# Patient Record
Sex: FEMALE | Race: OTHER | HISPANIC OR LATINO | URBAN - METROPOLITAN AREA
[De-identification: names, ages, dates, MRNs, and addresses within clinical notes are randomized per-mention and may not be internally consistent; named-entity substitution may affect disease eponyms.]

---

## 2019-04-23 ENCOUNTER — EMERGENCY (EMERGENCY)
Facility: HOSPITAL | Age: 59
LOS: 1 days | Discharge: HOME | End: 2019-04-23
Attending: EMERGENCY MEDICINE
Payer: MEDICAID

## 2019-04-23 VITALS
RESPIRATION RATE: 16 BRPM | HEART RATE: 75 BPM | SYSTOLIC BLOOD PRESSURE: 145 MMHG | TEMPERATURE: 98 F | DIASTOLIC BLOOD PRESSURE: 81 MMHG | OXYGEN SATURATION: 99 %

## 2019-04-23 VITALS
HEART RATE: 64 BPM | SYSTOLIC BLOOD PRESSURE: 152 MMHG | WEIGHT: 164.02 LBS | RESPIRATION RATE: 16 BRPM | HEIGHT: 60 IN | TEMPERATURE: 98 F | OXYGEN SATURATION: 98 % | DIASTOLIC BLOOD PRESSURE: 84 MMHG

## 2019-04-23 LAB
ALBUMIN SERPL ELPH-MCNC: 3.9 G/DL — SIGNIFICANT CHANGE UP (ref 3.5–5)
ALP SERPL-CCNC: 80 U/L — SIGNIFICANT CHANGE UP (ref 40–120)
ALT FLD-CCNC: 43 U/L DA — SIGNIFICANT CHANGE UP (ref 10–60)
ANION GAP SERPL CALC-SCNC: 5 MMOL/L — SIGNIFICANT CHANGE UP (ref 5–17)
APTT BLD: 32.2 SEC — SIGNIFICANT CHANGE UP (ref 27.5–36.3)
AST SERPL-CCNC: 27 U/L — SIGNIFICANT CHANGE UP (ref 10–40)
BILIRUB SERPL-MCNC: 0.3 MG/DL — SIGNIFICANT CHANGE UP (ref 0.2–1.2)
BUN SERPL-MCNC: 17 MG/DL — SIGNIFICANT CHANGE UP (ref 7–18)
CALCIUM SERPL-MCNC: 9.1 MG/DL — SIGNIFICANT CHANGE UP (ref 8.4–10.5)
CHLORIDE SERPL-SCNC: 107 MMOL/L — SIGNIFICANT CHANGE UP (ref 96–108)
CO2 SERPL-SCNC: 28 MMOL/L — SIGNIFICANT CHANGE UP (ref 22–31)
CREAT SERPL-MCNC: 0.62 MG/DL — SIGNIFICANT CHANGE UP (ref 0.5–1.3)
GLUCOSE SERPL-MCNC: 110 MG/DL — HIGH (ref 70–99)
HCT VFR BLD CALC: 41.9 % — SIGNIFICANT CHANGE UP (ref 34.5–45)
HGB BLD-MCNC: 13.5 G/DL — SIGNIFICANT CHANGE UP (ref 11.5–15.5)
INR BLD: 0.94 RATIO — SIGNIFICANT CHANGE UP (ref 0.88–1.16)
LIDOCAIN IGE QN: 131 U/L — SIGNIFICANT CHANGE UP (ref 73–393)
MAGNESIUM SERPL-MCNC: 2.3 MG/DL — SIGNIFICANT CHANGE UP (ref 1.6–2.6)
MCHC RBC-ENTMCNC: 31.5 PG — SIGNIFICANT CHANGE UP (ref 27–34)
MCHC RBC-ENTMCNC: 32.2 GM/DL — SIGNIFICANT CHANGE UP (ref 32–36)
MCV RBC AUTO: 97.7 FL — SIGNIFICANT CHANGE UP (ref 80–100)
NRBC # BLD: 0 /100 WBCS — SIGNIFICANT CHANGE UP (ref 0–0)
NT-PROBNP SERPL-SCNC: 57 PG/ML — SIGNIFICANT CHANGE UP (ref 0–125)
PLATELET # BLD AUTO: 270 K/UL — SIGNIFICANT CHANGE UP (ref 150–400)
POTASSIUM SERPL-MCNC: 4.6 MMOL/L — SIGNIFICANT CHANGE UP (ref 3.5–5.3)
POTASSIUM SERPL-SCNC: 4.6 MMOL/L — SIGNIFICANT CHANGE UP (ref 3.5–5.3)
PROT SERPL-MCNC: 7.6 G/DL — SIGNIFICANT CHANGE UP (ref 6–8.3)
PROTHROM AB SERPL-ACNC: 10.4 SEC — SIGNIFICANT CHANGE UP (ref 10–12.9)
RBC # BLD: 4.29 M/UL — SIGNIFICANT CHANGE UP (ref 3.8–5.2)
RBC # FLD: 12.8 % — SIGNIFICANT CHANGE UP (ref 10.3–14.5)
SODIUM SERPL-SCNC: 140 MMOL/L — SIGNIFICANT CHANGE UP (ref 135–145)
TROPONIN I SERPL-MCNC: <0.015 NG/ML — SIGNIFICANT CHANGE UP (ref 0–0.04)
TSH SERPL-MCNC: 1.49 UU/ML — SIGNIFICANT CHANGE UP (ref 0.34–4.82)
WBC # BLD: 6.44 K/UL — SIGNIFICANT CHANGE UP (ref 3.8–10.5)
WBC # FLD AUTO: 6.44 K/UL — SIGNIFICANT CHANGE UP (ref 3.8–10.5)

## 2019-04-23 PROCEDURE — 85027 COMPLETE CBC AUTOMATED: CPT

## 2019-04-23 PROCEDURE — 99284 EMERGENCY DEPT VISIT MOD MDM: CPT | Mod: 25

## 2019-04-23 PROCEDURE — 85610 PROTHROMBIN TIME: CPT

## 2019-04-23 PROCEDURE — 80053 COMPREHEN METABOLIC PANEL: CPT

## 2019-04-23 PROCEDURE — 36415 COLL VENOUS BLD VENIPUNCTURE: CPT

## 2019-04-23 PROCEDURE — 85730 THROMBOPLASTIN TIME PARTIAL: CPT

## 2019-04-23 PROCEDURE — 84484 ASSAY OF TROPONIN QUANT: CPT

## 2019-04-23 PROCEDURE — 83735 ASSAY OF MAGNESIUM: CPT

## 2019-04-23 PROCEDURE — 93005 ELECTROCARDIOGRAM TRACING: CPT

## 2019-04-23 PROCEDURE — 99285 EMERGENCY DEPT VISIT HI MDM: CPT

## 2019-04-23 PROCEDURE — 82962 GLUCOSE BLOOD TEST: CPT

## 2019-04-23 PROCEDURE — 83690 ASSAY OF LIPASE: CPT

## 2019-04-23 PROCEDURE — 83880 ASSAY OF NATRIURETIC PEPTIDE: CPT

## 2019-04-23 PROCEDURE — 84443 ASSAY THYROID STIM HORMONE: CPT

## 2019-04-23 NOTE — ED PROVIDER NOTE - PROGRESS NOTE DETAILS
Pt asymptomatic, recommended holter monitor, pt states she already had monitor for symptoms in the past, no arrythmia found. Educated on results, care and f/u.

## 2019-04-23 NOTE — ED ADULT NURSE NOTE - OBJECTIVE STATEMENT
alert and verbally responsive BIB EMS for sudden onset of palpitation this morning Pt denies C/O dizziness C/Pat at present

## 2019-04-23 NOTE — ED PROVIDER NOTE - OBJECTIVE STATEMENT
57 y/o F pt with a PMHx of migraine, presents to the ED with complaints of 2 episodes of palpitations accommodated with tingling in both hands and associated nausea and dizziness. Patient reports symptoms occurred around 9am this morning and this again 1 hour later. Patient notes she had similar symptoms 3 years ago and was seen in a hospital and discharged after testing. Patient had a recent cardiac workup and stress test done this year with normal results. Patient currently denies any other complaints. NKDA.

## 2019-04-23 NOTE — ED PROVIDER NOTE - CLINICAL SUMMARY MEDICAL DECISION MAKING FREE TEXT BOX
59 y/o F pt presents with episodes of palpitations with dizziness. Patient is asymptomatic at this time with no EKG changes. Will check labs, TSH and reassess. Anticipate discharge if results are normal.

## 2019-04-23 NOTE — ED ADULT NURSE NOTE - NSIMPLEMENTINTERV_GEN_ALL_ED
Implemented All Universal Safety Interventions:  [place] to call system. Call bell, personal items and telephone within reach. Instruct patient to call for assistance. Room bathroom lighting operational. Non-slip footwear when patient is off stretcher. Physically safe environment: no spills, clutter or unnecessary equipment. Stretcher in lowest position, wheels locked, appropriate side rails in place.

## 2019-06-02 ENCOUNTER — EMERGENCY (EMERGENCY)
Facility: HOSPITAL | Age: 59
LOS: 1 days | Discharge: HOME | End: 2019-06-02
Attending: EMERGENCY MEDICINE
Payer: MEDICAID

## 2019-06-02 VITALS
SYSTOLIC BLOOD PRESSURE: 138 MMHG | DIASTOLIC BLOOD PRESSURE: 83 MMHG | HEART RATE: 64 BPM | WEIGHT: 162.92 LBS | RESPIRATION RATE: 18 BRPM | OXYGEN SATURATION: 98 % | HEIGHT: 61 IN | TEMPERATURE: 98 F

## 2019-06-02 VITALS
HEART RATE: 65 BPM | RESPIRATION RATE: 18 BRPM | OXYGEN SATURATION: 98 % | SYSTOLIC BLOOD PRESSURE: 115 MMHG | DIASTOLIC BLOOD PRESSURE: 68 MMHG

## 2019-06-02 DIAGNOSIS — Z98.891 HISTORY OF UTERINE SCAR FROM PREVIOUS SURGERY: Chronic | ICD-10-CM

## 2019-06-02 LAB
ALBUMIN SERPL ELPH-MCNC: 3.5 G/DL — SIGNIFICANT CHANGE UP (ref 3.5–5)
ALP SERPL-CCNC: 76 U/L — SIGNIFICANT CHANGE UP (ref 40–120)
ALT FLD-CCNC: 22 U/L DA — SIGNIFICANT CHANGE UP (ref 10–60)
ANION GAP SERPL CALC-SCNC: 7 MMOL/L — SIGNIFICANT CHANGE UP (ref 5–17)
AST SERPL-CCNC: 15 U/L — SIGNIFICANT CHANGE UP (ref 10–40)
BASOPHILS # BLD AUTO: 0.02 K/UL — SIGNIFICANT CHANGE UP (ref 0–0.2)
BASOPHILS NFR BLD AUTO: 0.3 % — SIGNIFICANT CHANGE UP (ref 0–2)
BILIRUB SERPL-MCNC: 0.4 MG/DL — SIGNIFICANT CHANGE UP (ref 0.2–1.2)
BUN SERPL-MCNC: 16 MG/DL — SIGNIFICANT CHANGE UP (ref 7–18)
CALCIUM SERPL-MCNC: 9.2 MG/DL — SIGNIFICANT CHANGE UP (ref 8.4–10.5)
CHLORIDE SERPL-SCNC: 106 MMOL/L — SIGNIFICANT CHANGE UP (ref 96–108)
CK SERPL-CCNC: 33 U/L — SIGNIFICANT CHANGE UP (ref 21–215)
CO2 SERPL-SCNC: 29 MMOL/L — SIGNIFICANT CHANGE UP (ref 22–31)
CREAT SERPL-MCNC: 0.7 MG/DL — SIGNIFICANT CHANGE UP (ref 0.5–1.3)
D DIMER BLD IA.RAPID-MCNC: 998 NG/ML DDU — HIGH
EOSINOPHIL # BLD AUTO: 0.17 K/UL — SIGNIFICANT CHANGE UP (ref 0–0.5)
EOSINOPHIL NFR BLD AUTO: 2.7 % — SIGNIFICANT CHANGE UP (ref 0–6)
GLUCOSE SERPL-MCNC: 114 MG/DL — HIGH (ref 70–99)
HCT VFR BLD CALC: 40.5 % — SIGNIFICANT CHANGE UP (ref 34.5–45)
HGB BLD-MCNC: 12.9 G/DL — SIGNIFICANT CHANGE UP (ref 11.5–15.5)
IMM GRANULOCYTES NFR BLD AUTO: 0.3 % — SIGNIFICANT CHANGE UP (ref 0–1.5)
LYMPHOCYTES # BLD AUTO: 1.67 K/UL — SIGNIFICANT CHANGE UP (ref 1–3.3)
LYMPHOCYTES # BLD AUTO: 26.4 % — SIGNIFICANT CHANGE UP (ref 13–44)
MCHC RBC-ENTMCNC: 31.2 PG — SIGNIFICANT CHANGE UP (ref 27–34)
MCHC RBC-ENTMCNC: 31.9 GM/DL — LOW (ref 32–36)
MCV RBC AUTO: 98.1 FL — SIGNIFICANT CHANGE UP (ref 80–100)
MONOCYTES # BLD AUTO: 0.48 K/UL — SIGNIFICANT CHANGE UP (ref 0–0.9)
MONOCYTES NFR BLD AUTO: 7.6 % — SIGNIFICANT CHANGE UP (ref 2–14)
NEUTROPHILS # BLD AUTO: 3.97 K/UL — SIGNIFICANT CHANGE UP (ref 1.8–7.4)
NEUTROPHILS NFR BLD AUTO: 62.7 % — SIGNIFICANT CHANGE UP (ref 43–77)
NRBC # BLD: 0 /100 WBCS — SIGNIFICANT CHANGE UP (ref 0–0)
PLATELET # BLD AUTO: 270 K/UL — SIGNIFICANT CHANGE UP (ref 150–400)
POTASSIUM SERPL-MCNC: 4.4 MMOL/L — SIGNIFICANT CHANGE UP (ref 3.5–5.3)
POTASSIUM SERPL-SCNC: 4.4 MMOL/L — SIGNIFICANT CHANGE UP (ref 3.5–5.3)
PROT SERPL-MCNC: 7 G/DL — SIGNIFICANT CHANGE UP (ref 6–8.3)
RBC # BLD: 4.13 M/UL — SIGNIFICANT CHANGE UP (ref 3.8–5.2)
RBC # FLD: 13 % — SIGNIFICANT CHANGE UP (ref 10.3–14.5)
SODIUM SERPL-SCNC: 142 MMOL/L — SIGNIFICANT CHANGE UP (ref 135–145)
TROPONIN I SERPL-MCNC: <0.015 NG/ML — SIGNIFICANT CHANGE UP (ref 0–0.04)
WBC # BLD: 6.33 K/UL — SIGNIFICANT CHANGE UP (ref 3.8–10.5)
WBC # FLD AUTO: 6.33 K/UL — SIGNIFICANT CHANGE UP (ref 3.8–10.5)

## 2019-06-02 PROCEDURE — 99284 EMERGENCY DEPT VISIT MOD MDM: CPT | Mod: 25

## 2019-06-02 PROCEDURE — 71046 X-RAY EXAM CHEST 2 VIEWS: CPT | Mod: 26

## 2019-06-02 PROCEDURE — 96375 TX/PRO/DX INJ NEW DRUG ADDON: CPT | Mod: XU

## 2019-06-02 PROCEDURE — 71046 X-RAY EXAM CHEST 2 VIEWS: CPT

## 2019-06-02 PROCEDURE — 82550 ASSAY OF CK (CPK): CPT

## 2019-06-02 PROCEDURE — 93005 ELECTROCARDIOGRAM TRACING: CPT

## 2019-06-02 PROCEDURE — 71275 CT ANGIOGRAPHY CHEST: CPT

## 2019-06-02 PROCEDURE — 36415 COLL VENOUS BLD VENIPUNCTURE: CPT

## 2019-06-02 PROCEDURE — 96374 THER/PROPH/DIAG INJ IV PUSH: CPT | Mod: XU

## 2019-06-02 PROCEDURE — 85027 COMPLETE CBC AUTOMATED: CPT

## 2019-06-02 PROCEDURE — 80053 COMPREHEN METABOLIC PANEL: CPT

## 2019-06-02 PROCEDURE — 84484 ASSAY OF TROPONIN QUANT: CPT

## 2019-06-02 PROCEDURE — 71275 CT ANGIOGRAPHY CHEST: CPT | Mod: 26

## 2019-06-02 PROCEDURE — 99284 EMERGENCY DEPT VISIT MOD MDM: CPT

## 2019-06-02 PROCEDURE — 85379 FIBRIN DEGRADATION QUANT: CPT

## 2019-06-02 RX ORDER — ACETAMINOPHEN 500 MG
1000 TABLET ORAL ONCE
Refills: 0 | Status: COMPLETED | OUTPATIENT
Start: 2019-06-02 | End: 2019-06-02

## 2019-06-02 RX ORDER — KETOROLAC TROMETHAMINE 30 MG/ML
30 SYRINGE (ML) INJECTION ONCE
Refills: 0 | Status: DISCONTINUED | OUTPATIENT
Start: 2019-06-02 | End: 2019-06-02

## 2019-06-02 RX ADMIN — Medication 30 MILLIGRAM(S): at 09:38

## 2019-06-02 RX ADMIN — Medication 400 MILLIGRAM(S): at 13:19

## 2019-06-02 NOTE — ED PROVIDER NOTE - OBJECTIVE STATEMENT
58 y/o F pt with PMHx of HTN presents to ED c/o R breast pain x5 days. Pt describes pain as constant and worse with movement, sitting 60 y/o F pt with PMHx of HTN presents to ED c/o R breast pain x5 days. Pt describes pain as constant and worse with movement, sitting up, and deep inspiration. Pt also reports associated shortness of breath with pain only. 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 Pt took ibuprofen (last dose yesterday) with no symptomatic relief. Patient denies h/o similar pain, recent travel, fever, chills, cough, or any other complaints. Translation services offered but pt declined opting to have her friend at bedside translate.

## 2019-06-02 NOTE — ED ADULT TRIAGE NOTE - CHIEF COMPLAINT QUOTE
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

## 2019-06-02 NOTE — ED ADULT NURSE NOTE - OBJECTIVE STATEMENT
Pt AOx4, ambulatory, c/o right breast and right rib area pain x 5 days, s/p lifting a heavy patient, Pt works as a HHA. Pt endorses pain on with deep breathing. PT denies CP or palpitations, no fall/trauma.

## 2020-07-16 NOTE — ED PROVIDER NOTE - CHIEF COMPLAINT
See TE 3/12/2020 for surgery scheduling information. The patient is a 58y Female complaining of palpitations.

## 2020-12-14 ENCOUNTER — EMERGENCY (EMERGENCY)
Facility: HOSPITAL | Age: 60
LOS: 1 days | Discharge: HOME | End: 2020-12-14
Attending: EMERGENCY MEDICINE
Payer: MEDICAID

## 2020-12-14 VITALS
WEIGHT: 165.35 LBS | OXYGEN SATURATION: 98 % | HEIGHT: 61 IN | RESPIRATION RATE: 18 BRPM | SYSTOLIC BLOOD PRESSURE: 142 MMHG | HEART RATE: 67 BPM | TEMPERATURE: 98 F | DIASTOLIC BLOOD PRESSURE: 83 MMHG

## 2020-12-14 VITALS
HEART RATE: 68 BPM | OXYGEN SATURATION: 99 % | SYSTOLIC BLOOD PRESSURE: 115 MMHG | RESPIRATION RATE: 18 BRPM | DIASTOLIC BLOOD PRESSURE: 80 MMHG

## 2020-12-14 DIAGNOSIS — Z98.891 HISTORY OF UTERINE SCAR FROM PREVIOUS SURGERY: Chronic | ICD-10-CM

## 2020-12-14 PROBLEM — I10 ESSENTIAL (PRIMARY) HYPERTENSION: Chronic | Status: ACTIVE | Noted: 2019-06-02

## 2020-12-14 LAB
ALBUMIN SERPL ELPH-MCNC: 3.3 G/DL — LOW (ref 3.5–5)
ALP SERPL-CCNC: 79 U/L — SIGNIFICANT CHANGE UP (ref 40–120)
ALT FLD-CCNC: 28 U/L DA — SIGNIFICANT CHANGE UP (ref 10–60)
ANION GAP SERPL CALC-SCNC: 4 MMOL/L — LOW (ref 5–17)
APPEARANCE UR: CLEAR — SIGNIFICANT CHANGE UP
AST SERPL-CCNC: 21 U/L — SIGNIFICANT CHANGE UP (ref 10–40)
BASOPHILS # BLD AUTO: 0.02 K/UL — SIGNIFICANT CHANGE UP (ref 0–0.2)
BASOPHILS NFR BLD AUTO: 0.4 % — SIGNIFICANT CHANGE UP (ref 0–2)
BILIRUB SERPL-MCNC: 0.4 MG/DL — SIGNIFICANT CHANGE UP (ref 0.2–1.2)
BILIRUB UR-MCNC: NEGATIVE — SIGNIFICANT CHANGE UP
BUN SERPL-MCNC: 17 MG/DL — SIGNIFICANT CHANGE UP (ref 7–18)
CALCIUM SERPL-MCNC: 8.9 MG/DL — SIGNIFICANT CHANGE UP (ref 8.4–10.5)
CHLORIDE SERPL-SCNC: 109 MMOL/L — HIGH (ref 96–108)
CO2 SERPL-SCNC: 28 MMOL/L — SIGNIFICANT CHANGE UP (ref 22–31)
COLOR SPEC: YELLOW — SIGNIFICANT CHANGE UP
CREAT SERPL-MCNC: 0.73 MG/DL — SIGNIFICANT CHANGE UP (ref 0.5–1.3)
DIFF PNL FLD: ABNORMAL
EOSINOPHIL # BLD AUTO: 0.22 K/UL — SIGNIFICANT CHANGE UP (ref 0–0.5)
EOSINOPHIL NFR BLD AUTO: 4.7 % — SIGNIFICANT CHANGE UP (ref 0–6)
GLUCOSE SERPL-MCNC: 111 MG/DL — HIGH (ref 70–99)
GLUCOSE UR QL: NEGATIVE — SIGNIFICANT CHANGE UP
HCT VFR BLD CALC: 40.5 % — SIGNIFICANT CHANGE UP (ref 34.5–45)
HGB BLD-MCNC: 12.9 G/DL — SIGNIFICANT CHANGE UP (ref 11.5–15.5)
IMM GRANULOCYTES NFR BLD AUTO: 0.2 % — SIGNIFICANT CHANGE UP (ref 0–1.5)
KETONES UR-MCNC: NEGATIVE — SIGNIFICANT CHANGE UP
LEUKOCYTE ESTERASE UR-ACNC: ABNORMAL
LIDOCAIN IGE QN: 146 U/L — SIGNIFICANT CHANGE UP (ref 73–393)
LYMPHOCYTES # BLD AUTO: 1.39 K/UL — SIGNIFICANT CHANGE UP (ref 1–3.3)
LYMPHOCYTES # BLD AUTO: 30 % — SIGNIFICANT CHANGE UP (ref 13–44)
MCHC RBC-ENTMCNC: 31.4 PG — SIGNIFICANT CHANGE UP (ref 27–34)
MCHC RBC-ENTMCNC: 31.9 GM/DL — LOW (ref 32–36)
MCV RBC AUTO: 98.5 FL — SIGNIFICANT CHANGE UP (ref 80–100)
MONOCYTES # BLD AUTO: 0.44 K/UL — SIGNIFICANT CHANGE UP (ref 0–0.9)
MONOCYTES NFR BLD AUTO: 9.5 % — SIGNIFICANT CHANGE UP (ref 2–14)
NEUTROPHILS # BLD AUTO: 2.56 K/UL — SIGNIFICANT CHANGE UP (ref 1.8–7.4)
NEUTROPHILS NFR BLD AUTO: 55.2 % — SIGNIFICANT CHANGE UP (ref 43–77)
NITRITE UR-MCNC: NEGATIVE — SIGNIFICANT CHANGE UP
NRBC # BLD: 0 /100 WBCS — SIGNIFICANT CHANGE UP (ref 0–0)
PH UR: 6 — SIGNIFICANT CHANGE UP (ref 5–8)
PLATELET # BLD AUTO: 260 K/UL — SIGNIFICANT CHANGE UP (ref 150–400)
POTASSIUM SERPL-MCNC: 4.4 MMOL/L — SIGNIFICANT CHANGE UP (ref 3.5–5.3)
POTASSIUM SERPL-SCNC: 4.4 MMOL/L — SIGNIFICANT CHANGE UP (ref 3.5–5.3)
PROT SERPL-MCNC: 6.9 G/DL — SIGNIFICANT CHANGE UP (ref 6–8.3)
PROT UR-MCNC: NEGATIVE — SIGNIFICANT CHANGE UP
RAPID RVP RESULT: DETECTED
RBC # BLD: 4.11 M/UL — SIGNIFICANT CHANGE UP (ref 3.8–5.2)
RBC # FLD: 13.1 % — SIGNIFICANT CHANGE UP (ref 10.3–14.5)
RV+EV RNA SPEC QL NAA+PROBE: DETECTED
SARS-COV-2 RNA SPEC QL NAA+PROBE: SIGNIFICANT CHANGE UP
SODIUM SERPL-SCNC: 141 MMOL/L — SIGNIFICANT CHANGE UP (ref 135–145)
SP GR SPEC: 1.01 — SIGNIFICANT CHANGE UP (ref 1.01–1.02)
TROPONIN I SERPL-MCNC: <0.015 NG/ML — SIGNIFICANT CHANGE UP (ref 0–0.04)
TROPONIN I SERPL-MCNC: <0.015 NG/ML — SIGNIFICANT CHANGE UP (ref 0–0.04)
UROBILINOGEN FLD QL: NEGATIVE — SIGNIFICANT CHANGE UP
WBC # BLD: 4.64 K/UL — SIGNIFICANT CHANGE UP (ref 3.8–10.5)
WBC # FLD AUTO: 4.64 K/UL — SIGNIFICANT CHANGE UP (ref 3.8–10.5)

## 2020-12-14 PROCEDURE — 87086 URINE CULTURE/COLONY COUNT: CPT

## 2020-12-14 PROCEDURE — 80053 COMPREHEN METABOLIC PANEL: CPT

## 2020-12-14 PROCEDURE — 99284 EMERGENCY DEPT VISIT MOD MDM: CPT

## 2020-12-14 PROCEDURE — 96375 TX/PRO/DX INJ NEW DRUG ADDON: CPT

## 2020-12-14 PROCEDURE — 85025 COMPLETE CBC W/AUTO DIFF WBC: CPT

## 2020-12-14 PROCEDURE — 84484 ASSAY OF TROPONIN QUANT: CPT

## 2020-12-14 PROCEDURE — 71045 X-RAY EXAM CHEST 1 VIEW: CPT | Mod: 26

## 2020-12-14 PROCEDURE — 81001 URINALYSIS AUTO W/SCOPE: CPT

## 2020-12-14 PROCEDURE — 93005 ELECTROCARDIOGRAM TRACING: CPT

## 2020-12-14 PROCEDURE — 71045 X-RAY EXAM CHEST 1 VIEW: CPT

## 2020-12-14 PROCEDURE — 99284 EMERGENCY DEPT VISIT MOD MDM: CPT | Mod: 25

## 2020-12-14 PROCEDURE — 96374 THER/PROPH/DIAG INJ IV PUSH: CPT

## 2020-12-14 PROCEDURE — 0225U NFCT DS DNA&RNA 21 SARSCOV2: CPT

## 2020-12-14 PROCEDURE — 83690 ASSAY OF LIPASE: CPT

## 2020-12-14 PROCEDURE — 36415 COLL VENOUS BLD VENIPUNCTURE: CPT

## 2020-12-14 RX ORDER — ACETAMINOPHEN 500 MG
650 TABLET ORAL ONCE
Refills: 0 | Status: COMPLETED | OUTPATIENT
Start: 2020-12-14 | End: 2020-12-14

## 2020-12-14 RX ORDER — SODIUM CHLORIDE 9 MG/ML
1000 INJECTION INTRAMUSCULAR; INTRAVENOUS; SUBCUTANEOUS ONCE
Refills: 0 | Status: COMPLETED | OUTPATIENT
Start: 2020-12-14 | End: 2020-12-14

## 2020-12-14 RX ORDER — CEFTRIAXONE 500 MG/1
1000 INJECTION, POWDER, FOR SOLUTION INTRAMUSCULAR; INTRAVENOUS ONCE
Refills: 0 | Status: COMPLETED | OUTPATIENT
Start: 2020-12-14 | End: 2020-12-14

## 2020-12-14 RX ORDER — CEFUROXIME AXETIL 250 MG
1 TABLET ORAL
Qty: 14 | Refills: 0
Start: 2020-12-14 | End: 2020-12-20

## 2020-12-14 RX ORDER — FAMOTIDINE 10 MG/ML
1 INJECTION INTRAVENOUS
Qty: 15 | Refills: 0
Start: 2020-12-14 | End: 2020-12-28

## 2020-12-14 RX ORDER — ONDANSETRON 8 MG/1
4 TABLET, FILM COATED ORAL ONCE
Refills: 0 | Status: COMPLETED | OUTPATIENT
Start: 2020-12-14 | End: 2020-12-14

## 2020-12-14 RX ADMIN — ONDANSETRON 4 MILLIGRAM(S): 8 TABLET, FILM COATED ORAL at 07:00

## 2020-12-14 RX ADMIN — Medication 650 MILLIGRAM(S): at 08:00

## 2020-12-14 RX ADMIN — Medication 30 MILLILITER(S): at 07:00

## 2020-12-14 RX ADMIN — Medication 650 MILLIGRAM(S): at 07:00

## 2020-12-14 RX ADMIN — CEFTRIAXONE 100 MILLIGRAM(S): 500 INJECTION, POWDER, FOR SOLUTION INTRAMUSCULAR; INTRAVENOUS at 07:58

## 2020-12-14 RX ADMIN — SODIUM CHLORIDE 1000 MILLILITER(S): 9 INJECTION INTRAMUSCULAR; INTRAVENOUS; SUBCUTANEOUS at 06:43

## 2020-12-14 NOTE — ED PROVIDER NOTE - PATIENT PORTAL LINK FT
You can access the FollowMyHealth Patient Portal offered by [facility] by registering at the following website: [email or web address]. By joining [employer]’s FollowMyHealth portal, you will also be able to view your health information using other applications (apps) compatible with our system.

## 2020-12-14 NOTE — ED PROVIDER NOTE - CLINICAL SUMMARY MEDICAL DECISION MAKING FREE TEXT BOX
59yo F with hx HTN/HLD presents with epigastric pain. Will obtain ekg, labs, CXR. Given GI cocktail and tylenol for headache. Will reassess. 61yo F with hx HTN/HLD presents with epigastric pain. Will obtain ekg, labs, CXR. Given GI cocktail and tylenol for headache. Will reassess.    ekg nonischemic, trop neg, cmp/lipase wnl, CXR unremarkable, UA cw UTI-given ceftriaxone  discussed above with patient. On reeval patient reports only minimal epigastric discomfort. patient agrees with plan for repeat trop prior to discharge.  patient signedout to DR. Gannon at 8am.

## 2020-12-14 NOTE — ED PROVIDER NOTE - CARE PLAN
Principal Discharge DX:	GERD (gastroesophageal reflux disease)  Secondary Diagnosis:	UTI (urinary tract infection)

## 2020-12-14 NOTE — ED PROVIDER NOTE - NSFOLLOWUPINSTRUCTIONS_ED_ALL_ED_FT
Take medication as prescribed.  Followup with PMD for reevaluation.  Return to ED if you develop severe chest pain/abdominal pain or difficulty breathing.    [place] los medicamentos según lo prescrito.  Seguimiento con PMD para reevaluación.  Regrese al servicio de urgencias si presenta dolor severo en el pecho / dolor abdominal o dificultad para respirar.      Enfermedad de reflujo gastroesofágico en los adultos    Gastroesophageal Reflux Disease, Adult    El reflujo gastroesofágico (RGE) ocurre cuando el ácido del estómago sube por el tubo que conecta la boca con el estómago (esófago). Normalmente, la comida baja por el esófago y se mantiene en el estómago, donde se la digiere. Cuando harpreet persona tiene RGE, los alimentos y el ácido estomacal suelen volver al esófago. Usted puede tener harpreet enfermedad llamada enfermedad de reflujo gastroesofágico (ERGE) si el reflujo:  •Sucede a menudo.      •Causa síntomas frecuentes o muy intensos.      •Causa problemas tales saray daño en el esófago.      Cuando esto ocurre, el esófago duele y se hincha (inflama). Con el tiempo, la ERGE puede ocasionar pequeños agujeros (úlceras) en el revestimiento del esófago.      ¿Cuáles son las causas?  Esta afección se debe a un problema en el músculo que se encuentra entre el esófago y el estómago. Cuando nasreen músculo está débil o no es normal, no se hanna correctamente para impedir que los alimentos y el ácido regresen del estómago. El músculo puede debilitarse debido a lo siguiente:  •El consumo de tabaco.       •Embarazo.       •Tener cierto tipo de hernia (hernia de hiato).      •Consumo de alcohol.       •Ciertos alimentos y bebidas, saray café, chocolate, cebollas y menta.        ¿Qué incrementa el riesgo?  Es más probable que tenga esta afección si:  •Tiene sobrepeso.      •Tiene harpreet enfermedad que afecta el tejido conjuntivo.      •Usa antiinflamatorios no esteroideos (CHARISMA).        ¿Cuáles son los signos o los síntomas?  Los síntomas de esta afección incluyen:  •Acidez estomacal.       •Dificultad o dolor al tragar.       •Sensación de tener un bulto en la garganta.       •Sabor amargo en la boca.       •Mal aliento.       •Tener harpreet gran cantidad de saliva.      •Estómago inflamado o con malestar.       •Eructos.       •Dolor en el pecho. El dolor de pecho puede deberse a distintas afecciones. Asegúrese de consultar a schneider médico si tiene dolor en el pecho.      •Falta de aire o respiración ruidosa (sibilancias).      •Tos heaven (crónica) o hasmukh la noche.      •Desgaste de la superficie de los dientes (esmalte dental).      •Pérdida de peso.        ¿Cómo se trata?  El tratamiento dependerá de la gravedad de los síntomas. El médico puede sugerirle lo siguiente:  •Cambios en la dieta.       •Medicamentos.       •Harpreet cirugía.        Siga estas indicaciones en schneider casa:      Comida y bebida    •Siga harpreet dieta saray se lo haya indicado el médico. Es posible que deba evitar alimentos y bebidas, por ejemplo:  •Café y té (con o sin cafeína).      •Bebidas que contengan alcohol.      •Bebidas energéticas y deportivas.      •Bebidas gaseosas y refrescos.      •Chocolate y cacao.      •Menta y esencia de menta.      •[place] y cebolla.      •Rábano picante.      •Alimentos ácidos y condimentados. Estos incluyen todos los tipos de pimientos, chile en polvo, lloyd en polvo, vinagre, salsas picantes y salsa barbacoa.      •Cítricos y flower jugos, por ejemplo, naranjas, winston y tawny.      •Alimentos que contengan tomate. Estos incluyen salsa geovanna, chile, salsa picante y pizza con salsa de tomate.      •Alimentos fritos y grasos. Estos incluyen donas, radha fritas, papitas fritas de bolsa y aderezos con alto contenido de grasa.      •Hill con alto contenido de grasa. Estas incluye los perros calientes, chuletas o costillas, embutidos, jamón y tocino.      •Productos lácteos ricos en grasas, saray leche entera, manteca y queso crema.        •Consuma pequeñas cantidades de comida con más frecuencia. Evite consumir porciones abundantes.      •Evite beber grandes cantidades de líquidos con las comidas.      •Evite comer 2 o 3 horas antes de acostarse.      •Evite recostarse inmediatamente después de comer.      • No meme ejercicios enseguida después de comer.        Estilo de silvino      • No consuma ningún producto que contenga nicotina o tabaco. Estos incluyen cigarrillos, cigarrillos electrónicos y tabaco para mascar. Si necesita ayuda para dejar de fumar, consulte al médico.      •Intente reducir el nivel de estrés. Si necesita ayuda para hacer esto, consulte al médico.      •Si tiene sobrepeso, baje harpreet cantidad de peso saludable para usted. Consulte a schneider médico para bajar de peso de manera kyle.      Indicaciones generales     •Esté atento a cualquier cambio en los síntomas.      •[place] los medicamentos de venta samm y los recetados solamente saray se lo haya indicado el médico. No tome aspirina, ibuprofeno ni otros CHARISMA a menos que el médico lo autorice.      •Use ropa holgada. No use nada apretado alrededor de la cintura.      •Levante (eleve) la cabecera de la cama aproximadamente 6 pulgadas (15 cm).      •Evite inclinarse si al hacerlo empeoran los síntomas.      •Concurra a todas las visitas de seguimiento saray se lo haya indicado el médico. [place] es importante.        Comuníquese con un médico si:    •Aparecen nuevos síntomas.      •Adelgaza y no sabe por qué.      •Tiene problemas para tragar o le duele cuando traga.      •Tiene sibilancias o tos persistente.      •Los síntomas no mejoran con el tratamiento.      •Tiene la voz ronca.        Solicite ayuda inmediatamente si:    •Siente dolor en los brazos, el valentina, la mandíbula, los dientes o la espalda.      •Se siente transpirado, mareado o tiene harpreet sensación de desvanecimiento.      •Siente falta de aire o dolor en el pecho.      •Vomita y el vómito tiene un aspecto similar a la parish o a los posos de café.      •Pierde el conocimiento (se desmaya).      •Las deposiciones (heces) son sanguinolentas o negras.      •No puede tragar, beber o comer.        Resumen    •Si harpreet persona tiene enfermedad de reflujo gastroesofágico (ERGE), los alimentos y el ácido estomacal suben al esófago y causan síntomas o problemas tales saray daño en el esófago.      •El tratamiento dependerá de la gravedad de los síntomas.      •Siga harpreet dieta saray se lo haya indicado el médico.      •[place] todos los medicamentos solamente saray se lo haya indicado el médico.

## 2020-12-14 NOTE — ED PROVIDER NOTE - OBJECTIVE STATEMENT
61yo F with hx HTN/HLD presents with epigastric pain. Reports pain onset at 430am when she woke up from sleep. Reports pain radiates through her chest and up her throat "as if something is coming up." Denies sour taste in her mouth. Reports mild nausea but no vomiting. Denies fevers, cough, shortness of breath, diarrhea or urinary symptoms. Denies similar symptoms in past. Reports she did not feel like eating her usual breakfast. Currently reports mild headache.

## 2020-12-15 LAB
CULTURE RESULTS: SIGNIFICANT CHANGE UP
SPECIMEN SOURCE: SIGNIFICANT CHANGE UP

## 2021-08-11 ENCOUNTER — EMERGENCY (EMERGENCY)
Facility: HOSPITAL | Age: 61
LOS: 1 days | Discharge: HOME | End: 2021-08-11
Attending: STUDENT IN AN ORGANIZED HEALTH CARE EDUCATION/TRAINING PROGRAM
Payer: MEDICAID

## 2021-08-11 VITALS
WEIGHT: 169.76 LBS | HEIGHT: 62.6 IN | SYSTOLIC BLOOD PRESSURE: 144 MMHG | DIASTOLIC BLOOD PRESSURE: 86 MMHG | RESPIRATION RATE: 16 BRPM | HEART RATE: 73 BPM | OXYGEN SATURATION: 98 % | TEMPERATURE: 98 F

## 2021-08-11 DIAGNOSIS — Z98.891 HISTORY OF UTERINE SCAR FROM PREVIOUS SURGERY: Chronic | ICD-10-CM

## 2021-08-11 PROCEDURE — 93010 ELECTROCARDIOGRAM REPORT: CPT

## 2021-08-11 PROCEDURE — 99284 EMERGENCY DEPT VISIT MOD MDM: CPT

## 2021-08-11 RX ORDER — IBUPROFEN 200 MG
400 TABLET ORAL ONCE
Refills: 0 | Status: COMPLETED | OUTPATIENT
Start: 2021-08-11 | End: 2021-08-11

## 2021-08-11 RX ORDER — ACETAMINOPHEN 500 MG
650 TABLET ORAL ONCE
Refills: 0 | Status: COMPLETED | OUTPATIENT
Start: 2021-08-11 | End: 2021-08-11

## 2021-08-11 NOTE — ED ADULT TRIAGE NOTE - HEIGHT IN FEET
5 Surgeon/Pathologist Verbiage (Will Incorporate Name Of Surgeon From Intro If Not Blank): operated in two distinct and integrated capacities as the surgeon and pathologist.

## 2021-08-12 VITALS
OXYGEN SATURATION: 97 % | SYSTOLIC BLOOD PRESSURE: 160 MMHG | DIASTOLIC BLOOD PRESSURE: 91 MMHG | HEART RATE: 55 BPM | TEMPERATURE: 98 F | RESPIRATION RATE: 16 BRPM

## 2021-08-12 PROCEDURE — 99283 EMERGENCY DEPT VISIT LOW MDM: CPT

## 2021-08-12 PROCEDURE — 93005 ELECTROCARDIOGRAM TRACING: CPT

## 2021-08-12 RX ADMIN — Medication 400 MILLIGRAM(S): at 00:35

## 2021-08-12 RX ADMIN — Medication 650 MILLIGRAM(S): at 00:35

## 2021-08-12 RX ADMIN — Medication 400 MILLIGRAM(S): at 00:07

## 2021-08-12 RX ADMIN — Medication 650 MILLIGRAM(S): at 00:05

## 2021-08-12 NOTE — ED PROVIDER NOTE - NSFOLLOWUPINSTRUCTIONS_ED_ALL_ED_FT
You were seen in the emergency room today for blood pressure and headache. Please obtain the next available appointment with the cardiology doctors. Please call your primary doctor to inform them of this ER visit and obtain the next available appointment within the next 5 days. As we discussed, return to the ER if you have any worsening symptoms.    We no longer feel that you need further emergency care or admission to the hospital at this time.    While we have determined that you are currently stable for discharge, we know that things can change. Please seek immediate medical attention or return to the ER if you experience any of the following:  Any worsening or persistent symptoms  Severe Pain  Chest Pain  Difficulty Breathing  Bleeding  Passing Out  Severe Rash  Inability to Eat or Drink  Persistent Fever    Please see a primary care doctor or specialist within 5 days to ensure that you are improving.    Please call the [facility] phone numbers on this document if you have any problems obtaining a follow up appointment.    I wish you well! -Dr Colon

## 2021-08-12 NOTE — ED PROVIDER NOTE - NSICDXPASTMEDICALHX_GEN_ALL_CORE_FT
PAST MEDICAL HISTORY:  Herniated disc, cervical not cervial, lumbar region    HTN (hypertension)     Migraine

## 2021-08-12 NOTE — ED PROVIDER NOTE - NSFOLLOWUPCLINICS_GEN_ALL_ED_FT
[facility]  Cardiology  [address] [place] [address]  Phone: [phone]  Fax:   Follow Up Time: 1-3 Days

## 2021-08-12 NOTE — ED PROVIDER NOTE - PATIENT PORTAL LINK FT
You can access the FollowMyHealth Patient Portal offered by [facility] by registering at the following website: [email or web address]. By joining [employer]’s FollowMyHealth portal, you will also be able to view your health information using other applications (apps) compatible with our system.

## 2021-08-12 NOTE — ED PROVIDER NOTE - OBJECTIVE STATEMENT
60 y/o female with PMHx HTN, HLD, migraine presents to ED c/o hypertension. Patient states concern for blood pressure reading at home with associated R sided headache. Patient denies other associated symptoms. Patient states started having R sided headache consistent with previous migraines and checked blood pressure which was 156 systolic prompting ED visit. Patient states no medication prior to arrival, no change blood pressure medication. Patient denies blurry vision, neck stiffness, focal numbness/weakness, syncope, nausea, vomiting, diarrhea, chest pain, shortness of breath, fever or other recent illnesses/hospitalizations.

## 2021-08-12 NOTE — ED PROVIDER NOTE - EKG ADDITIONAL QUESTION - PERFORMED INDEPENDENT VISUALIZATION
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 Team would like to start metformin. Recommend to start at 500 mg once daily and uptitrate if needed. Broad spectrum antibiotic review completed. Patient admit with new oncologic diagnosis and placed on cefepime D4 for fevers and functional neutropenia to r/o infection. Currently BC negative > 48hr and pt afebrile. Per team, will dc cefepime later today since infection ruled-out. Yes

## 2021-08-12 NOTE — ED PROVIDER NOTE - CLINICAL SUMMARY MEDICAL DECISION MAKING FREE TEXT BOX
60 y/o female presents with concern for blood pressure and mild headache, obtaining EKG and giving medication, patient stable will reassess. 60 y/o female presents with concern for blood pressure and mild headache, obtaining EKG and giving medication, patient stable will reassess.    EKG showing TWI in V3 however no recent chest pain nor other ACS symptoms. On reassessment pt states that she is feeling better with no more HA and no other new symptoms. Rec cards f/u given new TWI. Most likely non emergent etiology of symptoms- the details of the case, history, and exam make more emergent diagnoses much less likely. Discussed with pt my clinical impression and results, patient given strict return precautions if persistent or worsening of symptoms occurs, and need for close follow up. Pt expressed understanding and agrees with plan. Pt is well appearing with a reassuring exam. Discharge home with PMD or Specialist f/u within 5 days.

## 2021-08-28 ENCOUNTER — EMERGENCY (EMERGENCY)
Facility: HOSPITAL | Age: 61
LOS: 1 days | Discharge: HOME | End: 2021-08-28
Attending: EMERGENCY MEDICINE
Payer: MEDICAID

## 2021-08-28 VITALS
RESPIRATION RATE: 18 BRPM | OXYGEN SATURATION: 99 % | WEIGHT: 175.05 LBS | HEIGHT: 61 IN | DIASTOLIC BLOOD PRESSURE: 76 MMHG | SYSTOLIC BLOOD PRESSURE: 155 MMHG | HEART RATE: 67 BPM | TEMPERATURE: 98 F

## 2021-08-28 DIAGNOSIS — Z98.891 HISTORY OF UTERINE SCAR FROM PREVIOUS SURGERY: Chronic | ICD-10-CM

## 2021-08-28 LAB
ALBUMIN SERPL ELPH-MCNC: 3.5 G/DL — SIGNIFICANT CHANGE UP (ref 3.5–5)
ALP SERPL-CCNC: 70 U/L — SIGNIFICANT CHANGE UP (ref 40–120)
ALT FLD-CCNC: 33 U/L DA — SIGNIFICANT CHANGE UP (ref 10–60)
ANION GAP SERPL CALC-SCNC: 3 MMOL/L — LOW (ref 5–17)
AST SERPL-CCNC: 31 U/L — SIGNIFICANT CHANGE UP (ref 10–40)
BASOPHILS # BLD AUTO: 0.03 K/UL — SIGNIFICANT CHANGE UP (ref 0–0.2)
BASOPHILS NFR BLD AUTO: 0.4 % — SIGNIFICANT CHANGE UP (ref 0–2)
BILIRUB SERPL-MCNC: 0.3 MG/DL — SIGNIFICANT CHANGE UP (ref 0.2–1.2)
BUN SERPL-MCNC: 26 MG/DL — HIGH (ref 7–18)
CALCIUM SERPL-MCNC: 8.7 MG/DL — SIGNIFICANT CHANGE UP (ref 8.4–10.5)
CHLORIDE SERPL-SCNC: 107 MMOL/L — SIGNIFICANT CHANGE UP (ref 96–108)
CO2 SERPL-SCNC: 29 MMOL/L — SIGNIFICANT CHANGE UP (ref 22–31)
CREAT SERPL-MCNC: 0.73 MG/DL — SIGNIFICANT CHANGE UP (ref 0.5–1.3)
EOSINOPHIL # BLD AUTO: 0.26 K/UL — SIGNIFICANT CHANGE UP (ref 0–0.5)
EOSINOPHIL NFR BLD AUTO: 3.5 % — SIGNIFICANT CHANGE UP (ref 0–6)
GLUCOSE SERPL-MCNC: 107 MG/DL — HIGH (ref 70–99)
HCT VFR BLD CALC: 38.5 % — SIGNIFICANT CHANGE UP (ref 34.5–45)
HGB BLD-MCNC: 12.7 G/DL — SIGNIFICANT CHANGE UP (ref 11.5–15.5)
IMM GRANULOCYTES NFR BLD AUTO: 0.4 % — SIGNIFICANT CHANGE UP (ref 0–1.5)
LIDOCAIN IGE QN: 127 U/L — SIGNIFICANT CHANGE UP (ref 73–393)
LYMPHOCYTES # BLD AUTO: 1.61 K/UL — SIGNIFICANT CHANGE UP (ref 1–3.3)
LYMPHOCYTES # BLD AUTO: 21.7 % — SIGNIFICANT CHANGE UP (ref 13–44)
MCHC RBC-ENTMCNC: 32.2 PG — SIGNIFICANT CHANGE UP (ref 27–34)
MCHC RBC-ENTMCNC: 33 GM/DL — SIGNIFICANT CHANGE UP (ref 32–36)
MCV RBC AUTO: 97.5 FL — SIGNIFICANT CHANGE UP (ref 80–100)
MONOCYTES # BLD AUTO: 0.6 K/UL — SIGNIFICANT CHANGE UP (ref 0–0.9)
MONOCYTES NFR BLD AUTO: 8.1 % — SIGNIFICANT CHANGE UP (ref 2–14)
NEUTROPHILS # BLD AUTO: 4.9 K/UL — SIGNIFICANT CHANGE UP (ref 1.8–7.4)
NEUTROPHILS NFR BLD AUTO: 65.9 % — SIGNIFICANT CHANGE UP (ref 43–77)
NRBC # BLD: 0 /100 WBCS — SIGNIFICANT CHANGE UP (ref 0–0)
PLATELET # BLD AUTO: 252 K/UL — SIGNIFICANT CHANGE UP (ref 150–400)
POTASSIUM SERPL-MCNC: 5.1 MMOL/L — SIGNIFICANT CHANGE UP (ref 3.5–5.3)
POTASSIUM SERPL-SCNC: 5.1 MMOL/L — SIGNIFICANT CHANGE UP (ref 3.5–5.3)
PROT SERPL-MCNC: 7.3 G/DL — SIGNIFICANT CHANGE UP (ref 6–8.3)
RBC # BLD: 3.95 M/UL — SIGNIFICANT CHANGE UP (ref 3.8–5.2)
RBC # FLD: 13.2 % — SIGNIFICANT CHANGE UP (ref 10.3–14.5)
SODIUM SERPL-SCNC: 139 MMOL/L — SIGNIFICANT CHANGE UP (ref 135–145)
TROPONIN I SERPL-MCNC: <0.015 NG/ML — SIGNIFICANT CHANGE UP (ref 0–0.04)
WBC # BLD: 7.43 K/UL — SIGNIFICANT CHANGE UP (ref 3.8–10.5)
WBC # FLD AUTO: 7.43 K/UL — SIGNIFICANT CHANGE UP (ref 3.8–10.5)

## 2021-08-28 PROCEDURE — 85025 COMPLETE CBC W/AUTO DIFF WBC: CPT

## 2021-08-28 PROCEDURE — 96374 THER/PROPH/DIAG INJ IV PUSH: CPT

## 2021-08-28 PROCEDURE — 84484 ASSAY OF TROPONIN QUANT: CPT

## 2021-08-28 PROCEDURE — 99284 EMERGENCY DEPT VISIT MOD MDM: CPT | Mod: 25

## 2021-08-28 PROCEDURE — 80053 COMPREHEN METABOLIC PANEL: CPT

## 2021-08-28 PROCEDURE — 83690 ASSAY OF LIPASE: CPT

## 2021-08-28 PROCEDURE — 99284 EMERGENCY DEPT VISIT MOD MDM: CPT

## 2021-08-28 PROCEDURE — 36415 COLL VENOUS BLD VENIPUNCTURE: CPT

## 2021-08-28 RX ORDER — SUCRALFATE 1 G
1 TABLET ORAL
Qty: 56 | Refills: 0
Start: 2021-08-28 | End: 2021-09-10

## 2021-08-28 RX ORDER — FAMOTIDINE 10 MG/ML
20 INJECTION INTRAVENOUS ONCE
Refills: 0 | Status: COMPLETED | OUTPATIENT
Start: 2021-08-28 | End: 2021-08-28

## 2021-08-28 RX ORDER — SUCRALFATE 1 G
1 TABLET ORAL ONCE
Refills: 0 | Status: COMPLETED | OUTPATIENT
Start: 2021-08-28 | End: 2021-08-28

## 2021-08-28 RX ORDER — ACETAMINOPHEN 500 MG
650 TABLET ORAL ONCE
Refills: 0 | Status: COMPLETED | OUTPATIENT
Start: 2021-08-28 | End: 2021-08-28

## 2021-08-28 RX ORDER — FAMOTIDINE 10 MG/ML
1 INJECTION INTRAVENOUS
Qty: 28 | Refills: 0
Start: 2021-08-28 | End: 2021-09-10

## 2021-08-28 RX ORDER — LIDOCAINE 4 G/100G
10 CREAM TOPICAL ONCE
Refills: 0 | Status: COMPLETED | OUTPATIENT
Start: 2021-08-28 | End: 2021-08-28

## 2021-08-28 RX ADMIN — FAMOTIDINE 20 MILLIGRAM(S): 10 INJECTION INTRAVENOUS at 05:44

## 2021-08-28 RX ADMIN — Medication 1 GRAM(S): at 05:45

## 2021-08-28 RX ADMIN — Medication 30 MILLILITER(S): at 05:45

## 2021-08-28 RX ADMIN — LIDOCAINE 10 MILLILITER(S): 4 CREAM TOPICAL at 05:45

## 2021-08-28 RX ADMIN — Medication 650 MILLIGRAM(S): at 05:45

## 2021-08-28 NOTE — ED PROVIDER NOTE - OBJECTIVE STATEMENT
61 year old female PMH HTN, HLD migraine coming in with 3 days of epigastric abdominal pain that hasn't improved. states she ate pasta and that's when the symptoms began. denies fevers, chills, sweats, back pains, cp, sob, palpitations, urinary complaints. denies radiation of pain.

## 2021-08-28 NOTE — ED PROVIDER NOTE - NSFOLLOWUPINSTRUCTIONS_ED_ALL_ED_FT
Log Out.      [employer]® CareNotes®     :  [facility]  	                       GASTRITIS - AfterCare(R) Instructions(ER/ED)           Gastritis    LO QUE NECESITA SABER:    La gastritis es harpreet inflamación o irritación del revestimiento del estómago.     Órganos abdominales         INSTRUCCIONES SOBRE EL [facility]ERIE HOSPITALARIA:    Llame al 911 en rd de presentar lo siguiente:  •Tiene dolor de pecho o le falta el aire.          Regrese a la phyllis de emergencias si:  •Usted vomita parish.      •Usted tiene evacuaciones intestinales negras o con parish.      •Usted tiene un alec dolor de estómago o de espalda.      Comuníquese con schneider médico si:  •Tiene fiebre.      •Usted tiene síntomas nuevos o estos empeoran, aun después del tratamiento.      •Usted tiene preguntas o inquietudes acerca de schneider condición o cuidado.      Medicamentos:  •Los medicamentospara ayudar a tratar la infección bacteriana o para disminuir el ácido estomacal.      •[place] flower medicamentos saray se le haya indicado.Consulte con schneider médico si usted tono que schneider medicamento no le está ayudando o si presenta efectos secundarios. Infórmele si es alérgico a cualquier medicamento. Mantenga harpreet lista actualizada de los medicamentos, las vitaminas y los productos herbales que alvina. Incluya los siguientes datos de los medicamentos: cantidad, frecuencia y motivo de administración. Traiga con usted la lista o los envases de las píldoras a flower citas de seguimiento. Lleve la lista de los medicamentos con usted en rd de harpreet emergencia.      Maneje o evite la gastritis:  •No fume.La nicotina y otras sustancias químicas de los cigarrillos y los cigarros pueden empeorar flower síntomas y causar daño pulmonar. Pida información a schneider médico si usted actualmente fuma y necesita ayuda para dejar de fumar. Los cigarrillos electrónicos o el tabaco sin humo igualmente contienen nicotina. Consulte con schneider médico antes de utilizar estos productos.      •No consuma alcohol.El alcohol puede evitar la cicatrización y empeorar la gastritis. Consulte con schneider médico si usted necesita ayuda para dejar de raiza alcohol.      •No tome medicamentos CHARISMA o aspirina a menos que así se lo indiquen.Estos analgésicos y medicamentos similares pueden causar irritación. Si schneider médico lo autoriza a raiza medicamentos CHARISMA, tómelos con la comida.      •No coma alimentos que le provocan irritación:Los alimentos saray las naranjas y la salsa pueden causar ardor o dolor. Consuma alimentos saludables y variados. Unos ejemplos incluyen las frutas (no las cítricas), verduras, productos lácteos descremados, legumbres, pan integral al igual que las nicho magras y pescado. Trate de comer porciones más pequeñas y raiza agua con flower comidas. No coma nada al menos por 3 horas antes de acostarse.      •Encuentre maneras de relajarse y reducir el estrés.El estrés puede aumentar el ácido estomacal y empeorar la gastritis. Las actividades saray el yoga, la meditación o el escuchar música pueden ayudarlo a relajarse. Pase tiempo con amigos, o meme cosas que disfruta.      Acuda a flower consultas de control con schneider médico según le indicaron.Puede que necesite exámenes o tratamiento continuos o derivación a un gastroenterólogo. [place] flower preguntas para que se acsebde de hacerlas hasmukh flower visitas.       © Copyright [employer] 2021           back to top                          © Copyright [employer] 2021

## 2021-08-28 NOTE — ED ADULT NURSE NOTE - CHIEF COMPLAINT QUOTE
Pt compliant of upper stomach pain with nausea and vomiting for couple of days but the pain is getting worse.

## 2021-08-28 NOTE — ED PROVIDER NOTE - PATIENT PORTAL LINK FT
You can access the FollowMyHealth Patient Portal offered by [facility] by registering at the following website: [email or web address]. By joining [employer]’s FollowMyHealth portal, you will also be able to view your health information using other applications (apps) compatible with our system.

## 2021-08-28 NOTE — ED PROVIDER NOTE - PROGRESS NOTE DETAILS
pains resolved. labs are unremarkable. likely gastritis. will dc with Gi meds. f/u with PMD. return precautions discussed.

## 2021-08-28 NOTE — ED ADULT NURSE NOTE - NSFALLRSKASSESSDT_ED_ALL_ED
Hospital Medicine  History and Physical    Patient:  Yoandy Randall  MRN: [id number]    CHIEF COMPLAINT:    Chief Complaint   Patient presents with    Chest Pain     shortness of breath       History Obtained From:  patient, electronic medical record  Primary Care Physician: SUDEEP Bowling CNP    HISTORY OF PRESENT ILLNESS:   The patient is a 49 y.o. female with a history of anxiety, asthma, TIA, renal cell carcinoma status post nephrectomy and chronic kidney disease, hypertension, type 2 diabetes, depression, hyperlipidemia, check sleep apnea noncompliant with CPAP and recurrent admissions for sarcoidosis and chest pain related to the sarcoidosis. Patient presents with sudden onset shortness of breath which occurred stable she was lying in her bed. It did not wake her from sleep. It is midsternal.  It does not have radiation. It is not associated with diaphoresis. She has chronic shortness of breath but does not feel like her shortness of breath is worsened from its baseline. She has no history of stenting or bypass surgery in the past.  She has been worked up in the past by cardiology most recently 2 years ago by Dr. Bryn Calabrese. Pain did not improve until arrival in the emergency department. On arrival in the emergency department EKG was obtained showing normal sinus rhythm with no ST changes or ST segment elevation. Basic metabolic profile showed only chronic kidney disease with a mild bump in creatinine of 1.45 above her baseline of about 1.3. Troponin was 0.010 brain natruretic peptide was 9 CBC was within normal limits. Chest x-ray shows no acute infiltrates.   On review of the patient's narcotic prescription profile the patient has had 35 prescriptions and 16 providers and is flagged as a very high risk for narcotic dependence/drug seeking behavior as per the [place]AR[place]    Past Medical History:      Diagnosis Date    Anxiety     Arthritis     Asthma     Bronchopneumonia     Cancer ([facility] [address] renal    Cerebral artery occlusion with cerebral infarction (HCC)     Chronic bilateral low back pain with sciatica     Chronic kidney disease     Depression     Hypertension     Insulin dependent type 2 diabetes mellitus, uncontrolled ([facility] [address] 8/3/2018    Mixed headache     Pure hyperglyceridemia 5/19/2017    Sarcoidosis     Sleep apnea     does not wear cpap    Thyroid goiter        Past Surgical History:      Procedure Laterality Date    BRONCHOSCOPY  10/26/2018    DR. STEARNS    KIDNEY REMOVAL Right 08/2016    THYROID LOBECTOMY Right 6/13/14    THYROIDECTOMY  02/21/2019    DR. MAGDALENO    URETER STENT PLACEMENT Left 08/2016       Medications Prior to Admission:    Prior to Admission medications    Medication Sig Start Date End Date Taking?  Authorizing Provider   fluticasone-salmeterol (ADVAIR) 250-50 MCG/DOSE AEPB Inhale 1 puff into the lungs 2 times daily   Yes Historical Provider, MD   methocarbamol (ROBAXIN) 750 MG tablet Take 750 mg by mouth 4 times daily   Yes Historical Provider, MD   methotrexate (RHEUMATREX) 1 g chemo injection Infuse 25 mg intravenously once Indications: 0.4 ml SQ every Monday   Yes Historical Provider, MD   predniSONE (DELTASONE) 10 MG tablet 4 daily for 4 days, 3 daily for 4 days, 2 daily for 4 days, then one and a half (15 mg) daily until follow-up 4/14/19  Yes Oscar Gutierrez MD   levothyroxine (SYNTHROID) 75 MCG tablet Take 75 mcg by mouth Daily   Yes Historical Provider, MD   butalbital-acetaminophen-caffeine (FIORICET, ESGIC) -40 MG per tablet TAKE 1 TABLET BY MOUTH THREE TIMES DAILY AS NEEDED FOR HEADACHES 3/8/19  Yes Radha Choudhury,    cetirizine (ZYRTEC) 10 MG tablet Take 1 qhs for allergies 11/27/18  Yes Radha Choudhury,    insulin lispro (ADMELOG) 100 UNIT/ML injection vial Inject 4 Units into the skin 3 times daily as needed for High Blood Sugar 11/13/18  Yes Radha Choudhury,    DULoxetine (CYMBALTA) 60 MG extended release capsule TAKE 1 CAPSULE BY MOUTH DAILY  Patient taking differently: Take 30 mg by mouth daily  9/30/18  Yes Gamal Corn, DO   insulin glargine (LANTUS SOLOSTAR) 100 UNIT/ML injection pen Inject 25 Units into the skin nightly   Yes Historical Provider, MD   montelukast (SINGULAIR) 10 MG tablet Take 1 tab nightly at supper for breathing/allergies 6/28/18  Yes Gamal Corn, DO   folic acid (FOLVITE) 1 MG tablet Take 1 tablet by mouth daily 6/25/18  Yes Mansoor Padilla MD   albuterol (PROVENTIL) (2.5 MG/3ML) 0.083% nebulizer solution Take 3 mLs by nebulization every 2 hours as needed for Wheezing 4/14/18  Yes Lavon Crawford MD   albuterol sulfate  (90 Base) MCG/ACT inhaler Inhale 2 puffs into the lungs every 4 hours as needed for Wheezing 4/14/18  Yes Lavon Crawford MD   busPIRone (BUSPAR) 10 MG tablet Take 1/2 tab bid wf x 1 week, then 1 tab bid  Patient taking differently: Take 10 mg by mouth daily Take 1/2 tab bid wf x 1 week, then 1 tab bid 9/27/17  Yes Gamal Corn, DO   oxyCODONE-acetaminophen (PERCOCET) 5-325 MG per tablet Take 1 tablet by mouth every 4 hours as needed for Pain. Historical Provider, MD   Insulin Pen Needle (B-D ULTRAFINE III SHORT PEN) 31G X 8 MM MISC Inject 1 each into the skin 3 times daily 1/5/19 2/4/19  Gamal Corn, DO   Insulin Syringe-Needle U-100 30G X 1/2\" 1 ML MISC 1 each by Does not apply route daily 11/16/18   Gamal Corn, DO   gabapentin (NEURONTIN) 400 MG capsule Take 1 capsule by mouth 3 times daily for 90 days. . 9/12/18 4/19/19  Gamal Corn, DO   blood glucose test strips (ONETOUCH VERIO) strip TEST 3 TIMES DAILY AS NEEDED 8/16/18   Gamal Corn, DO   Blood Glucose Monitoring Suppl (ONETOUCH VERIO) w/Device KIT TEST 3 TIMES DAILY AS NEEDED 8/16/18   Gamal De [place], DO   ONE TOUCH ULTRASOFT LANCETS MISC TEST 3 TIMES DAILY AS NEEDED 8/16/18   Gamal Corn, DO   blood glucose test strips (EXACTECH TEST) strip 1 each by In Vitro route 3 times daily (Accu-check test strips) As needed.  DX:E11.65 8/13/18   Bao Marie CREATININE 1.45*   GLUCOSE 137*   AST 14   ALT 14   BILITOT <0.2   ALKPHOS 104     Troponin T:   Recent Labs     05/18/19  2330   TROPONINI <0.010     EKG: Normal sinus rhythm    Assessment and Plan   1. Chest pain with HEART score 1: Trend cardiac enzymes, repeat EKG, consult cardiology to evaluate the need of possible cardiac workup on discharge. Nitro when necessary for chest pain. Hold NSAIDs until cardiac disease is ruled out. Low concern for an acute coronary syndrome however due to the patient's severity of sarcoidosis and autoimmune disease defer further workup to cardiology    2. Sarcoidosis: Consult pulmonary start prednisone 40 mg daily aggressive pulmonary toilet. Patient does not complain of shortness of breath worsened from her baseline however her baseline pulmonary functioning appears to be severely limited. Continue methotrexate and folic acid continue Singulair    3. Chronic steroid administration: Patient is currently taking 20 mg oral prednisone daily we'll initiate Bactrim coverage as this is her chronic dose from pulmonary. Defer further management to pulmonary after consultation tomorrow. 4. Anxiety: BuSpar\  5. Migraine headaches: Fioricet when necessary  6. Fibromyalgia: Cymbalta  7. Diabetes: Lantus 25 units daily at bedtime with 4 units Humalog 3 times a day before meals  8. Hypothyroid: Synthroid 75 µg daily  9. DVT PPX lovenox.      Patient Active Problem List   Diagnosis Code    Anxiety F41.9    Asthma J45.909    HTN (hypertension) I10    Thyroid goiter E04.9    Lumbar spondylosis M47.816    Cervical spondylosis without myelopathy M47.812    Spondylosis of lumbar region without myelopathy or radiculopathy--OARRS PM&R 5/24/17 M47.816    Renal cancer ([facility] [address] C64.9    Pure hyperglyceridemia E78.1    Morbid obesity (HCC) E66.01    Transient cerebral ischemia G45.9    Marijuana use F12.90    Chest pain R07.9    Meningitis G03.9    SOB (shortness of breath) R06.02    Exposure to potential infection Z20.9    Bronchopneumonia J18.0    Headache R51    Insulin dependent type 2 diabetes mellitus, uncontrolled (HCC) E11.65, Z79.4    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

## 2021-11-16 NOTE — ED ADULT TRIAGE NOTE - WEIGHT IN KG
77 Hgb drop from 13-> 10 after fluids. Could be dilutional but will continue to monitor    PLAN:  - f/u 2pm labs Hgb drop from 13-> 10 after fluids. Could be dilutional but will continue to monitor    PLAN:  - f/u 2pm labs  - 1u pRBC for Hgb <7 Hgb drop from 13-> 10 after fluids. No signs of bleeding.     PLAN:  - transfuse < 7

## 2022-04-18 ENCOUNTER — EMERGENCY (EMERGENCY)
Facility: HOSPITAL | Age: 62
LOS: 1 days | Discharge: HOME | End: 2022-04-18
Attending: EMERGENCY MEDICINE
Payer: MEDICAID

## 2022-04-18 DIAGNOSIS — Z98.891 HISTORY OF UTERINE SCAR FROM PREVIOUS SURGERY: Chronic | ICD-10-CM

## 2022-04-18 PROCEDURE — 99285 EMERGENCY DEPT VISIT HI MDM: CPT

## 2022-04-19 VITALS
TEMPERATURE: 98 F | WEIGHT: 169.98 LBS | OXYGEN SATURATION: 98 % | HEIGHT: 63 IN | HEART RATE: 63 BPM | RESPIRATION RATE: 16 BRPM | DIASTOLIC BLOOD PRESSURE: 88 MMHG | SYSTOLIC BLOOD PRESSURE: 143 MMHG

## 2022-04-19 VITALS
DIASTOLIC BLOOD PRESSURE: 71 MMHG | SYSTOLIC BLOOD PRESSURE: 101 MMHG | HEART RATE: 124 BPM | RESPIRATION RATE: 18 BRPM | OXYGEN SATURATION: 100 %

## 2022-04-19 LAB
ALBUMIN SERPL ELPH-MCNC: 3.5 G/DL — SIGNIFICANT CHANGE UP (ref 3.5–5)
ALP SERPL-CCNC: 68 U/L — SIGNIFICANT CHANGE UP (ref 40–120)
ALT FLD-CCNC: 34 U/L DA — SIGNIFICANT CHANGE UP (ref 10–60)
ANION GAP SERPL CALC-SCNC: 9 MMOL/L — SIGNIFICANT CHANGE UP (ref 5–17)
APPEARANCE UR: CLEAR — SIGNIFICANT CHANGE UP
AST SERPL-CCNC: 18 U/L — SIGNIFICANT CHANGE UP (ref 10–40)
BASOPHILS # BLD AUTO: 0.04 K/UL — SIGNIFICANT CHANGE UP (ref 0–0.2)
BASOPHILS NFR BLD AUTO: 0.6 % — SIGNIFICANT CHANGE UP (ref 0–2)
BILIRUB SERPL-MCNC: 0.3 MG/DL — SIGNIFICANT CHANGE UP (ref 0.2–1.2)
BILIRUB UR-MCNC: NEGATIVE — SIGNIFICANT CHANGE UP
BUN SERPL-MCNC: 28 MG/DL — HIGH (ref 7–18)
CALCIUM SERPL-MCNC: 9.7 MG/DL — SIGNIFICANT CHANGE UP (ref 8.4–10.5)
CHLORIDE SERPL-SCNC: 106 MMOL/L — SIGNIFICANT CHANGE UP (ref 96–108)
CO2 SERPL-SCNC: 25 MMOL/L — SIGNIFICANT CHANGE UP (ref 22–31)
COLOR SPEC: YELLOW — SIGNIFICANT CHANGE UP
CREAT SERPL-MCNC: 0.72 MG/DL — SIGNIFICANT CHANGE UP (ref 0.5–1.3)
D DIMER BLD IA.RAPID-MCNC: <150 NG/ML DDU — SIGNIFICANT CHANGE UP
DIFF PNL FLD: NEGATIVE — SIGNIFICANT CHANGE UP
EGFR: 94 ML/MIN/1.73M2 — SIGNIFICANT CHANGE UP
EOSINOPHIL # BLD AUTO: 0.19 K/UL — SIGNIFICANT CHANGE UP (ref 0–0.5)
EOSINOPHIL NFR BLD AUTO: 2.7 % — SIGNIFICANT CHANGE UP (ref 0–6)
GLUCOSE SERPL-MCNC: 117 MG/DL — HIGH (ref 70–99)
GLUCOSE UR QL: NEGATIVE — SIGNIFICANT CHANGE UP
HCT VFR BLD CALC: 38.8 % — SIGNIFICANT CHANGE UP (ref 34.5–45)
HGB BLD-MCNC: 12.9 G/DL — SIGNIFICANT CHANGE UP (ref 11.5–15.5)
IMM GRANULOCYTES NFR BLD AUTO: 0.1 % — SIGNIFICANT CHANGE UP (ref 0–1.5)
KETONES UR-MCNC: NEGATIVE — SIGNIFICANT CHANGE UP
LEUKOCYTE ESTERASE UR-ACNC: NEGATIVE — SIGNIFICANT CHANGE UP
LYMPHOCYTES # BLD AUTO: 2.48 K/UL — SIGNIFICANT CHANGE UP (ref 1–3.3)
LYMPHOCYTES # BLD AUTO: 34.7 % — SIGNIFICANT CHANGE UP (ref 13–44)
MCHC RBC-ENTMCNC: 32.2 PG — SIGNIFICANT CHANGE UP (ref 27–34)
MCHC RBC-ENTMCNC: 33.2 GM/DL — SIGNIFICANT CHANGE UP (ref 32–36)
MCV RBC AUTO: 96.8 FL — SIGNIFICANT CHANGE UP (ref 80–100)
MONOCYTES # BLD AUTO: 0.67 K/UL — SIGNIFICANT CHANGE UP (ref 0–0.9)
MONOCYTES NFR BLD AUTO: 9.4 % — SIGNIFICANT CHANGE UP (ref 2–14)
NEUTROPHILS # BLD AUTO: 3.76 K/UL — SIGNIFICANT CHANGE UP (ref 1.8–7.4)
NEUTROPHILS NFR BLD AUTO: 52.5 % — SIGNIFICANT CHANGE UP (ref 43–77)
NITRITE UR-MCNC: NEGATIVE — SIGNIFICANT CHANGE UP
NRBC # BLD: 0 /100 WBCS — SIGNIFICANT CHANGE UP (ref 0–0)
NT-PROBNP SERPL-SCNC: 87 PG/ML — SIGNIFICANT CHANGE UP (ref 0–125)
PH UR: 6.5 — SIGNIFICANT CHANGE UP (ref 5–8)
PLATELET # BLD AUTO: 264 K/UL — SIGNIFICANT CHANGE UP (ref 150–400)
POTASSIUM SERPL-MCNC: 4.2 MMOL/L — SIGNIFICANT CHANGE UP (ref 3.5–5.3)
POTASSIUM SERPL-SCNC: 4.2 MMOL/L — SIGNIFICANT CHANGE UP (ref 3.5–5.3)
PROT SERPL-MCNC: 7.1 G/DL — SIGNIFICANT CHANGE UP (ref 6–8.3)
PROT UR-MCNC: NEGATIVE — SIGNIFICANT CHANGE UP
RBC # BLD: 4.01 M/UL — SIGNIFICANT CHANGE UP (ref 3.8–5.2)
RBC # FLD: 12.3 % — SIGNIFICANT CHANGE UP (ref 10.3–14.5)
SARS-COV-2 RNA SPEC QL NAA+PROBE: SIGNIFICANT CHANGE UP
SODIUM SERPL-SCNC: 140 MMOL/L — SIGNIFICANT CHANGE UP (ref 135–145)
SP GR SPEC: 1.01 — SIGNIFICANT CHANGE UP (ref 1.01–1.02)
TROPONIN I, HIGH SENSITIVITY RESULT: 6.4 NG/L — SIGNIFICANT CHANGE UP
UROBILINOGEN FLD QL: NEGATIVE — SIGNIFICANT CHANGE UP
WBC # BLD: 7.15 K/UL — SIGNIFICANT CHANGE UP (ref 3.8–10.5)
WBC # FLD AUTO: 7.15 K/UL — SIGNIFICANT CHANGE UP (ref 3.8–10.5)

## 2022-04-19 PROCEDURE — 83880 ASSAY OF NATRIURETIC PEPTIDE: CPT

## 2022-04-19 PROCEDURE — 93005 ELECTROCARDIOGRAM TRACING: CPT

## 2022-04-19 PROCEDURE — 81003 URINALYSIS AUTO W/O SCOPE: CPT

## 2022-04-19 PROCEDURE — 85025 COMPLETE CBC W/AUTO DIFF WBC: CPT

## 2022-04-19 PROCEDURE — 99285 EMERGENCY DEPT VISIT HI MDM: CPT | Mod: 25

## 2022-04-19 PROCEDURE — 71045 X-RAY EXAM CHEST 1 VIEW: CPT

## 2022-04-19 PROCEDURE — 84484 ASSAY OF TROPONIN QUANT: CPT

## 2022-04-19 PROCEDURE — 85379 FIBRIN DEGRADATION QUANT: CPT

## 2022-04-19 PROCEDURE — 36415 COLL VENOUS BLD VENIPUNCTURE: CPT

## 2022-04-19 PROCEDURE — 87635 SARS-COV-2 COVID-19 AMP PRB: CPT

## 2022-04-19 PROCEDURE — 71045 X-RAY EXAM CHEST 1 VIEW: CPT | Mod: 26

## 2022-04-19 PROCEDURE — 80053 COMPREHEN METABOLIC PANEL: CPT

## 2022-04-19 NOTE — ED PROVIDER NOTE - OBJECTIVE STATEMENT
61 year old female with PMHx of hypertension, hyperlipidemia, and arthritis presents to the ED with complaints of elevated blood pressure and a headache today. Patient states that at 21:00 tonight she measured her blood pressure at home and found it to be elevated at 167/94. Patient endorses having a mild headache associated with nausea at the time. Patient reports that she forgot to take her prescribed 50 mg of Losartan yesterday morning, and thus took a dose tonight. Patient is currently denying any headache, chest pain, or any other symptoms. NKDA. 61 year old female with PMHx of hypertension, hyperlipidemia, and arthritis presents to the ED with complaints of elevated blood pressure and a headache today. Patient states that at 21:00 tonight she measured her blood pressure at home and found it to be elevated at 167/94. Patient endorses having a mild headache associated with nausea at the time. Patient reports that she forgot to take her prescribed 50 mg of Losartan yesterday morning, and thus took a dose tonight. Patient is currently denying any headache, chest pain, or any other symptoms. NKDA.  Evaluation performed in Divehi language by me.

## 2022-04-19 NOTE — ED ADULT NURSE NOTE - NSICDXPASTMEDICALHX_GEN_ALL_CORE_FT
PAST MEDICAL HISTORY:  Herniated disc, cervical not cervial, lumbar region    HTN (hypertension)     Migraine       HLD (hyperlipidemia)     Rheumatoid arthritis

## 2022-04-19 NOTE — ED PROVIDER NOTE - NSFOLLOWUPINSTRUCTIONS_ED_ALL_ED_FT
Hipertensión en los adultos    Hypertension, Adult      La presión arterial norris (hipertensión) se produce cuando la fuerza de la parish bombea a través de las arterias con mucha fuerza. Las arterias son los vasos sanguíneos que transportan la parish desde el corazón al bebe del cuerpo. La hipertensión hace que el corazón jemima más esfuerzo para bombear parish y puede provocar que las arterias se estrechen o endurezcan. La hipertensión no tratada o no controlada puede causar infarto de miocardio, insuficiencia cardíaca, accidente cerebrovascular, enfermedad renal y otros problemas.    Harpreet lectura de la presión arterial consta de un número más alto sobre un número más bajo. En condiciones ideales, la presión arterial debe estar por debajo de 120/80. El primer número (“superior”) es la presión sistólica. Es la medida de la presión de las arterias cuando el corazón late. El tootie número (“inferior”) es la presión diastólica. Es la medida de la presión en las arterias cuando el corazón se relaja.      ¿Cuáles son las causas?    Se desconoce la causa exacta de esta afección. Hay algunas afecciones que causan presión arterial norris o están relacionadas con manuel.      ¿Qué incrementa el riesgo?    Algunos factores de riesgo de hipertensión están bajo schneider control. Los siguientes factores pueden hacer que sea más propenso a desarrollar esta afección:  •Fumar.      •Tener diabetes mellitus tipo 2, colesterol alto, o ambos.      •No hacer la cantidad suficiente de actividad física o ejercicio.      •Tener sobrepeso.      •Consumir mucha grasa, azúcar, calorías o sal (sodio) en schneider dieta.      •Beber alcohol en exceso.      Algunos factores de riesgo para la presión arterial norris pueden ser difíciles o imposibles de cambiar. Algunos de estos factores son los siguientes:  •Tener enfermedad renal crónica.      •Tener antecedentes familiares de presión arterial norris.      •Edad. Los riesgos aumentan con la edad.      •Yemi. El riesgo es mayor para las personas afroamericanas.      •Sexo. Antes de los 45 años, los hombres corren más riesgo que las mujeres. Después de los 65 años, las mujeres corren más riesgo que los hombres.      •Tener apnea obstructiva del sueño.      •Estrés.        ¿Cuáles son los signos o los síntomas?    Es posible que la presión arterial norris puede no cause síntomas. La presión arterial muy norris (crisis hipertensiva) puede provocar:  •Dolor de ayad.      •Ansiedad.      •Falta de aire.      •Hemorragia nasal.      •Náuseas y vómitos.      •Cambios en la visión.      •Dolor de pecho intenso.      •Convulsiones.        ¿Cómo se diagnostica?    Esta afección se diagnostica al medir schneider presión arterial mientras se encuentra sentado, con el brazo apoyado sobre harpreet superficie plana, las piernas sin cruzar y los pies sarah apoyados en el piso. El brazalete del tensiómetro debe colocarse directamente sobre la piel de la parte superior del brazo y al nivel de schneider corazón. Debe medirla al menos dos veces en el mismo brazo. Determinadas condiciones pueden causar harpreet diferencia de presión arterial entre el brazo anna y el derecho.    Ciertos factores pueden provocar que las lecturas de la presión arterial power inferiores o superiores a lo normal por un período corto de tiempo:  •Si schneider presión arterial es más norris cuando se encuentra en el consultorio del médico que cuando la mide en schneider hogar, se denomina “hipertensión de bata rio”. La mayoría de las personas que tienen esta afección no deben ser medicadas.      •Si schneider presión arterial es más norris en el hogar que cuando se encuentra en el consultorio del médico, se denomina “hipertensión enmascarada”. La mayoría de las personas que tienen esta afección deben ser medicadas para controlar la presión arterial.      Si tiene harpreet lecturas de presión arterial norris hasmukh harpreet visita o si tiene presión arterial normal con otros factores de riesgo, se le podrá pedir que jemima lo siguiente:  •Que regrese otro día para volver a controlar schneider presión arterial nuevamente.      •Que se controle la presión arterial en schneider casa hasmukh 1 semana o más.      Si se le diagnostica hipertensión, es posible que se le realicen otros análisis de parish o estudios de diagnóstico por imágenes para ayudar a schneider médico a comprender schneider riesgo general de tener otras afecciones.      ¿Cómo se trata?    Esta afección se trata haciendo cambios saludables en el estilo de silvino, tales saray ingerir alimentos saludables, realizar más ejercicio y reducir el consumo de alcohol. El médico puede recetarle medicamentos si los cambios en el estilo de silvino no son suficientes para lograr controlar la presión arterial y si:  •Schneider presión arterial sistólica está por encima de 130.      •Schneider presión arterial diastólica está por encima de 80.      La presión arterial deseada puede variar en función de las enfermedades, la edad y otros factores personales.      Siga estas instrucciones en schneider casa:      Comida y bebida    •Siga harpreet dieta con alto contenido de fibras y potasio, y con bajo contenido de sodio, azúcar agregada y grasas. Un ejemplo de plan alimenticio es la dieta DASH (Dietary Approaches to Stop Hypertension, Métodos alimenticios para detener la hipertensión). Para alimentarse de esta manera:  •Coma mucha fruta y verdura fresca. Trate de que la mitad del plato de cada comida sea de frutas y verduras.      •Coma cereales integrales, saray pasta integral, arroz integral o pan integral. Llene aproximadamente un cuarto del plato con cereales integrales.      •Coma y benito productos lácteos con bajo contenido de grasa, saray leche descremada o yogur bajo en grasas.      •Evite la ingesta de larsen de carne grasa, carne procesada o curada, y carne de ave con piel. Llene aproximadamente un cuarto del plato con proteínas magras, saray pescado, lori sin piel, frijoles, huevos o tofu.      •Evite ingerir alimentos prehechos y procesados. En general, estos tienen mayor cantidad de sodio, azúcar agregada y grasa.        •Reduzca schneider ingesta diaria de sodio. La mayoría de las personas que tienen hipertensión deben comer menos de 1500 mg de sodio por día.    • No benito alcohol si:  •Schneider médico le indica no hacerlo.      •Está embarazada, puede estar embarazada o está tratando de quedar embarazada.      •Si slime alcohol:•Limite la cantidad que slime a lo siguiente:  •De 0 a 1 medida por día para las mujeres.      •De 0 a 2 medidas por día para los hombres.        •Esté atento a la cantidad de alcohol que hay en las bebidas que alvina. En los [place], harpreet medida equivale a harpreet botella de cerveza de 12 oz (355 ml), un vaso de vino de 5 oz (148 ml) o un vaso de harpreet bebida alcohólica de norris graduación de 1½ oz (44 ml).          Estilo de silvino      •Trabaje con schneider médico para mantener un peso saludable o perder peso. Pregúntele cuál es el peso recomendado para usted.      •Jemima al menos 30 minutos de ejercicio la mayoría de los días de la semana. Estas actividades pueden incluir caminar, nadar o andar en bicicleta.      •Incluya ejercicios para [place] flower músculos (ejercicios de resistencia), saray [place] o levantamiento de pesas, saray parte de schneider rutina semanal de ejercicios. Intente realizar 30 minutos de nasreen tipo de ejercicios al menos tiffany días a la semana.      • No consuma ningún producto que contenga nicotina o tabaco, saray cigarrillos, cigarrillos electrónicos y tabaco de mascar. Si necesita ayuda para dejar de fumar, consulte al médico.      •Contrólese la presión arterial en schneider casa según las indicaciones del médico.      •Concurra a todas las visitas de seguimiento saray se lo haya indicado el médico. [place] es importante.      Medicamentos     •[place] los medicamentos de venta samm y los recetados solamente saray se lo haya indicado el médico. Siga cuidadosamente las indicaciones. Los medicamentos para la presión arterial deben tomarse según las indicaciones.      • No omita las dosis de medicamentos para la presión arterial. Si lo hace, estará en riesgo de tener problemas y puede hacer que los medicamentos power menos eficaces.      •Pregúntele a schneider médico a qué efectos secundarios o reacciones a los medicamentos debe prestar atención.        Comuníquese con un médico si:    •Piensa que tiene harpreet reacción a un medicamento que está tomando.      •Tiene edy de ayad frecuentes (recurrentes).      •Se siente mareado.      •Tiene hinchazón en los tobillos.      •Tiene problemas de visión.        Solicite ayuda inmediatamente si:    •Siente un dolor de ayad intenso o confusión.      •Siente debilidad inusual o adormecimiento.      •Siente que va a desmayarse.      •Siente un dolor intenso en el pecho o el abdomen.      •Vomita repetidas veces.      •Tiene dificultad para respirar.        Resumen    •La hipertensión se produce cuando la parish bombea en las arterias con mucha fuerza. Si esta afección no se controla, podría correr riesgo de tener complicaciones graves.      •La presión arterial deseada puede variar en función de las enfermedades, la edad y otros factores personales. Para la mayoría de las personas, harpreet presión arterial normal es dewey que 120/80.      •La hipertensión se trata con cambios en el estilo de silvino, medicamentos o harpreet combinación de ambos. Los cambios en el estilo de silvino incluyen pérdida de peso, ingerir alimentos sanos, seguir harpreet dieta baja en sodio, hacer más ejercicio y limitar el consumo de alcohol.

## 2022-04-19 NOTE — ED PROVIDER NOTE - PATIENT PORTAL LINK FT
You can access the FollowMyHealth Patient Portal offered by [facility] by registering at the following website: [email or web address]. By joining [facility]’s FollowMyHealth portal, you will also be able to view your health information using other applications (apps) compatible with our system.

## 2022-04-19 NOTE — ED PROVIDER NOTE - CHIEF COMPLAINT
ASSESSMENT AND PLAN :    Hypothyroidism, unspecified type  - THYROID STIMULATING HORMONE REFLEX; Future  - levothyroxine (SYNTHROID, LEVOTHROID) 200 MCG tablet; Take 1 tablet by mouth 3 days a week. Take on Monday, Wednesday and Friday's.  (dose adjustment 6/3/2019); Refill: 0  - levothyroxine (SYNTHROID, LEVOTHROID) 175 MCG tablet; Take 1 tablet by mouth 4 days a week. Every Sunday, Tues, Thursday and Saturday. (dose adjustment 6/3/2019)      Done      
Notified patient of lab results.  Patient wrote down Medication change -  Take 200 MCG Monday, Wed and Friday.  Takes 175 MCG all other days.  Repeat TSH in 3 months.   
Please call patient regarding the following labs:    Component      Latest Ref Rng & Units 3/29/2019 5/31/2019   TSH      0.350 - 5.000 mcUnits/mL 3.250 0.318 (L)   T4, FREE      0.8 - 1.5 ng/dL  1.2   TRIIODOTHYRONINE, FREE      2.2 - 4.0 pg/mL  2.6      March she was on Synthroid 175 mcg daily and wanted her closer to 1 so increased for 200 mcg 5 days a week and 175 mcg 2 days a week.      Assessment:   Slightly hyper thyroid.      Plan: Change Synthroid to 200 mcg Mon, Wed, Fridays and Synthroid 175 mcg all other days of the week (Sun, Tues, Thurs and Fri).    - repeat TSH in 3 months.   Orders Placed This Encounter   • Thyroid Stimulating Hormone Reflex           Please call patient.  Return this note to me after you talk with her so I can update med list.   
The patient is a 61y Female complaining of headache.

## 2022-04-19 NOTE — ED ADULT NURSE NOTE - NSFALLRSKINDICATORS_ED_ALL_ED
A/P: 41yo POD#1 s/p LTCS. AM CBC reviewed and wnl. Patient is stable and doing well post-operatively. no

## 2022-04-19 NOTE — ED ADULT NURSE NOTE - NSIMPLEMENTINTERV_GEN_ALL_ED
Implemented All Universal Safety Interventions:  [place] to call system. Call bell, personal items and telephone within reach. Instruct patient to call for assistance. Room bathroom lighting operational. Non-slip footwear when patient is off stretcher. Physically safe environment: no spills, clutter or unnecessary equipment. Stretcher in lowest position, wheels locked, appropriate side rails in place.

## 2022-04-19 NOTE — ED PROVIDER NOTE - CLINICAL SUMMARY MEDICAL DECISION MAKING FREE TEXT BOX
61 year old female with PMHx of hypertension, hyperlipidemia, and arthritis presents to the ED with complaints of elevated blood pressure and a headache today. Will check labs, EKG, chest x-ray, and reassess. 61 year old female with PMHx of hypertension, hyperlipidemia, and arthritis presents to the ED with complaints of elevated blood pressure and a headache today. Will check labs, EKG, chest x-ray, and reassess.  ekg nonischemic, labs unremarkable, CXR unremarkable  discussed above with patient, on reeval patient denies recurrence of symptoms, pt stable for discharge.

## 2022-04-19 NOTE — ED ADULT NURSE NOTE - LANGUAGE ASSISTANCE NEEDED
As per patient friend Gutierrez is able to translate./No-Patient/Caregiver offered and refused free interpretation services.

## 2022-04-22 NOTE — ED PROVIDER NOTE - WR ORDER DATE AND TIME 1
19-Apr-2022 01:14 O-Z Flap Text: The defect edges were debeveled with a #15 scalpel blade.  Given the location of the defect, shape of the defect and the proximity to free margins an O-Z flap was deemed most appropriate.  Using a sterile surgical marker, an appropriate transposition flap was drawn incorporating the defect and placing the expected incisions within the relaxed skin tension lines where possible. The area thus outlined was incised deep to adipose tissue with a #15 scalpel blade.  The skin margins were undermined to an appropriate distance in all directions utilizing iris scissors.

## 2022-12-20 PROBLEM — E78.5 HYPERLIPIDEMIA, UNSPECIFIED: Chronic | Status: ACTIVE | Noted: 2022-04-19

## 2022-12-20 PROBLEM — M06.9 RHEUMATOID ARTHRITIS, UNSPECIFIED: Chronic | Status: ACTIVE | Noted: 2022-04-19

## 2022-12-29 PROBLEM — Z00.00 ENCOUNTER FOR PREVENTIVE HEALTH EXAMINATION: Status: ACTIVE | Noted: 2022-12-29

## 2023-01-04 ENCOUNTER — APPOINTMENT (OUTPATIENT)
Dept: CARDIOLOGY | Facility: CLINIC | Age: 63
End: 2023-01-04

## 2023-02-14 ENCOUNTER — EMERGENCY (EMERGENCY)
Facility: HOSPITAL | Age: 63
LOS: 1 days | Discharge: HOME | End: 2023-02-14
Attending: EMERGENCY MEDICINE
Payer: MEDICAID

## 2023-02-14 VITALS
SYSTOLIC BLOOD PRESSURE: 129 MMHG | TEMPERATURE: 98 F | HEART RATE: 69 BPM | DIASTOLIC BLOOD PRESSURE: 80 MMHG | RESPIRATION RATE: 18 BRPM | WEIGHT: 171.08 LBS | OXYGEN SATURATION: 97 %

## 2023-02-14 DIAGNOSIS — Z98.891 HISTORY OF UTERINE SCAR FROM PREVIOUS SURGERY: Chronic | ICD-10-CM

## 2023-02-14 PROCEDURE — 99282 EMERGENCY DEPT VISIT SF MDM: CPT

## 2023-02-14 PROCEDURE — 99284 EMERGENCY DEPT VISIT MOD MDM: CPT

## 2023-02-14 NOTE — ED PROVIDER NOTE - NSFOLLOWUPINSTRUCTIONS_ED_ALL_ED_FT
Hipertensión en los adultos    Hypertension, Adult      La presión arterial norris (hipertensión) se produce cuando la fuerza de la parish bombea a través de las arterias con mucha fuerza. Las arterias son los vasos sanguíneos que transportan la parish desde el corazón al bebe del cuerpo. La hipertensión hace que el corazón jemima más esfuerzo para bombear parish y puede provocar que las arterias se estrechen o endurezcan. La hipertensión no tratada o no controlada puede causar infarto de miocardio, insuficiencia cardíaca, accidente cerebrovascular, enfermedad renal y otros problemas.    Harpreet lectura de la presión arterial consta de un número más alto sobre un número más bajo. En condiciones ideales, la presión arterial debe estar por debajo de 120/80. El primer número (“superior”) es la presión sistólica. Es la medida de la presión de las arterias cuando el corazón late. El tootie número (“inferior”) es la presión diastólica. Es la medida de la presión en las arterias cuando el corazón se relaja.      ¿Cuáles son las causas?    Se desconoce la causa exacta de esta afección. Hay algunas afecciones que causan presión arterial norris o están relacionadas con manuel.      ¿Qué incrementa el riesgo?    Algunos factores de riesgo de hipertensión están bajo schneider control. Los siguientes factores pueden hacer que sea más propenso a desarrollar esta afección:  •Fumar.      •Tener diabetes mellitus tipo 2, colesterol alto, o ambos.      •No hacer la cantidad suficiente de actividad física o ejercicio.      •Tener sobrepeso.      •Consumir mucha grasa, azúcar, calorías o sal (sodio) en schneider dieta.      •Beber alcohol en exceso.      Algunos factores de riesgo para la presión arterial norris pueden ser difíciles o imposibles de cambiar. Algunos de estos factores son los siguientes:  •Tener enfermedad renal crónica.      •Tener antecedentes familiares de presión arterial norris.      •Edad. Los riesgos aumentan con la edad.      •Yemi. El riesgo es mayor para las personas afroamericanas.      •Sexo. Antes de los 45 años, los hombres corren más riesgo que las mujeres. Después de los 65 años, las mujeres corren más riesgo que los hombres.      •Tener apnea obstructiva del sueño.      •Estrés.        ¿Cuáles son los signos o los síntomas?    Es posible que la presión arterial norris puede no cause síntomas. La presión arterial muy norris (crisis hipertensiva) puede provocar:  •Dolor de ayad.      •Ansiedad.      •Falta de aire.      •Hemorragia nasal.      •Náuseas y vómitos.      •Cambios en la visión.      •Dolor de pecho intenso.      •Convulsiones.        ¿Cómo se diagnostica?    Esta afección se diagnostica al medir schneider presión arterial mientras se encuentra sentado, con el brazo apoyado sobre harpreet superficie plana, las piernas sin cruzar y los pies sarah apoyados en el piso. El brazalete del tensiómetro debe colocarse directamente sobre la piel de la parte superior del brazo y al nivel de schneider corazón. Debe medirla al menos dos veces en el mismo brazo. Determinadas condiciones pueden causar harpreet diferencia de presión arterial entre el brazo anna y el derecho.    Ciertos factores pueden provocar que las lecturas de la presión arterial power inferiores o superiores a lo normal por un período corto de tiempo:  •Si schneider presión arterial es más norris cuando se encuentra en el consultorio del médico que cuando la mide en schneider hogar, se denomina “hipertensión de bata rio”. La mayoría de las personas que tienen esta afección no deben ser medicadas.      •Si schneider presión arterial es más norris en el hogar que cuando se encuentra en el consultorio del médico, se denomina “hipertensión enmascarada”. La mayoría de las personas que tienen esta afección deben ser medicadas para controlar la presión arterial.      Si tiene harpreet lecturas de presión arterial norris hasmukh harpreet visita o si tiene presión arterial normal con otros factores de riesgo, se le podrá pedir que jemima lo siguiente:  •Que regrese otro día para volver a controlar cshneider presión arterial nuevamente.      •Que se controle la presión arterial en schneider casa hasmukh 1 semana o más.      Si se le diagnostica hipertensión, es posible que se le realicen otros análisis de parish o estudios de diagnóstico por imágenes para ayudar a schneider médico a comprender schneider riesgo general de tener otras afecciones.      ¿Cómo se trata?    Esta afección se trata haciendo cambios saludables en el estilo de silvino, tales saray ingerir alimentos saludables, realizar más ejercicio y reducir el consumo de alcohol. El médico puede recetarle medicamentos si los cambios en el estilo de silvino no son suficientes para lograr controlar la presión arterial y si:  •Schneider presión arterial sistólica está por encima de 130.      •Schneider presión arterial diastólica está por encima de 80.      La presión arterial deseada puede variar en función de las enfermedades, la edad y otros factores personales.      Siga estas instrucciones en schneider casa:      Comida y bebida    •Siga harpreet dieta con alto contenido de fibras y potasio, y con bajo contenido de sodio, azúcar agregada y grasas. Un ejemplo de plan alimenticio es la dieta DASH (Dietary Approaches to Stop Hypertension, Métodos alimenticios para detener la hipertensión). Para alimentarse de esta manera:  •Coma mucha fruta y verdura fresca. Trate de que la mitad del plato de cada comida sea de frutas y verduras.      •Coma cereales integrales, saray pasta integral, arroz integral o pan integral. Llene aproximadamente un cuarto del plato con cereales integrales.      •Coma y benito productos lácteos con bajo contenido de grasa, saray leche descremada o yogur bajo en grasas.      •Evite la ingesta de larsen de carne grasa, carne procesada o curada, y carne de ave con piel. Llene aproximadamente un cuarto del plato con proteínas magras, saray pescado, lori sin piel, frijoles, huevos o tofu.      •Evite ingerir alimentos prehechos y procesados. En general, estos tienen mayor cantidad de sodio, azúcar agregada y grasa.        •Reduzca schneider ingesta diaria de sodio. La mayoría de las personas que tienen hipertensión deben comer menos de 1500 mg de sodio por día.    • No benito alcohol si:  •Schneider médico le indica no hacerlo.      •Está embarazada, puede estar embarazada o está tratando de quedar embarazada.      •Si slime alcohol:•Limite la cantidad que slime a lo siguiente:  •De 0 a 1 medida por día para las mujeres.      •De 0 a 2 medidas por día para los hombres.        •Esté atento a la cantidad de alcohol que hay en las bebidas que alvina. En los [place], harpreet medida equivale a harpreet botella de cerveza de 12 oz (355 ml), un vaso de vino de 5 oz (148 ml) o un vaso de harpreet bebida alcohólica de norris graduación de 1½ oz (44 ml).          Estilo de silvino      •Trabaje con schneider médico para mantener un peso saludable o perder peso. Pregúntele cuál es el peso recomendado para usted.      •Jemima al menos 30 minutos de ejercicio la mayoría de los días de la semana. Estas actividades pueden incluir caminar, nadar o andar en bicicleta.      •Incluya ejercicios para [place] flower músculos (ejercicios de resistencia), saray [place] o levantamiento de pesas, saray parte de schneider rutina semanal de ejercicios. Intente realizar 30 minutos de nasreen tipo de ejercicios al menos tiffany días a la semana.      • No consuma ningún producto que contenga nicotina o tabaco, saray cigarrillos, cigarrillos electrónicos y tabaco de mascar. Si necesita ayuda para dejar de fumar, consulte al médico.      •Contrólese la presión arterial en schneider casa según las indicaciones del médico.      •Concurra a todas las visitas de seguimiento saray se lo haya indicado el médico. [place] es importante.      Medicamentos     •Hagaman los medicamentos de venta samm y los recetados solamente saray se lo haya indicado el médico. Siga cuidadosamente las indicaciones. Los medicamentos para la presión arterial deben tomarse según las indicaciones.      • No omita las dosis de medicamentos para la presión arterial. Si lo hace, estará en riesgo de tener problemas y puede hacer que los medicamentos power menos eficaces.      •Pregúntele a schneider médico a qué efectos secundarios o reacciones a los medicamentos debe prestar atención.        Comuníquese con un médico si:    •Piensa que tiene harpreet reacción a un medicamento que está tomando.      •Tiene edy de ayad frecuentes (recurrentes).      •Se siente mareado.      •Tiene hinchazón en los tobillos.      •Tiene problemas de visión.        Solicite ayuda inmediatamente si:    •Siente un dolor de ayad intenso o confusión.      •Siente debilidad inusual o adormecimiento.      •Siente que va a desmayarse.      •Siente un dolor intenso en el pecho o el abdomen.      •Vomita repetidas veces.      •Tiene dificultad para respirar.        Resumen    •La hipertensión se produce cuando la parish bombea en las arterias con mucha fuerza. Si esta afección no se controla, podría correr riesgo de tener complicaciones graves.      •La presión arterial deseada puede variar en función de las enfermedades, la edad y otros factores personales. Para la mayoría de las personas, harpreet presión arterial normal es dewey que 120/80.      •La hipertensión se trata con cambios en el estilo de silvino, medicamentos o harpreet combinación de ambos. Los cambios en el estilo de silvino incluyen pérdida de peso, ingerir alimentos sanos, seguir harpreet dieta baja en sodio, hacer más ejercicio y limitar el consumo de alcohol.      Esta información no tiene saray fin reemplazar el consejo del médico. Asegúrese de hacerle al médico cualquier pregunta que tenga.

## 2023-02-14 NOTE — ED ADULT NURSE NOTE - NSIMPLEMENTINTERV_GEN_ALL_ED
Implemented All Universal Safety Interventions:  [place] to call system. Call bell, personal items and telephone within reach. Instruct patient to call for assistance. Room bathroom lighting operational. Non-slip footwear when patient is off stretcher. Physically safe environment: no spills, clutter or unnecessary equipment. Stretcher in lowest position, wheels locked, appropriate side rails in place.

## 2023-02-14 NOTE — ED PROVIDER NOTE - CLINICAL SUMMARY MEDICAL DECISION MAKING FREE TEXT BOX
63 yo f with dizziness earlier, flet as if BP high. Normal exam. BP normal. Will have pt f/u PCP, continue to check bp as outpatient.

## 2023-02-14 NOTE — ED ADULT NURSE NOTE - CAS ELECT INFOMATION PROVIDED
February 22, 2018   Cris Sterling LPN          4:26 PM   Note      Another ML for patient to call me      Cris Sterling LPN          12:32 PM   Note      Called patient, ML to call me to further discuss colonoscopy         Me   to Cris Sterling LPN           12:21 PM    Pt states he wants a sigmoidoscopy - not a colonoscopy   please call pt to discuss      DC instructions

## 2023-02-14 NOTE — ED ADULT TRIAGE NOTE - HEART RATE (BEATS/MIN)
Pt arrives to ED via triage for fall occurring x8 days ago on 11-22-20. Pt reports he is on blood thinners. He c/o left shoulder pain, left knee pain, and right ankle pain. He reports he fell down his steps at home down appx 8-9 steps. He denies hitting his head, and is uncertain of LOC but reports he remembers falling.   
68

## 2023-02-14 NOTE — ED PROVIDER NOTE - OBJECTIVE STATEMENT
63 yo f denies pmh p/w episode of dizziness pta. Felt as if "my blood pressure was high" around 1 hour pta. Pt wanted to get her Bp checked. States she is currently asymptomatic. Not on meds. Denies cp, sob, nausea or HA.

## 2023-02-14 NOTE — ED ADULT NURSE NOTE - OBJECTIVE STATEMENT
As per patient c/o needing to get her blood pressure checked. Denies pain and denies any other symptoms.

## 2023-04-11 NOTE — ED PROVIDER NOTE - CARDIAC, MLM
Normal rate, regular rhythm.  Heart sounds S1, S2.  No murmurs, rubs or gallops.  Rt sided chest- reproducible CP,
For information on Fall & Injury Prevention, visit: [email or web address] OR  [email or web address] OR  https://www.cdc.gov/steadi/patient.html

## 2023-05-02 NOTE — ED ADULT NURSE NOTE - JUGULAR VENOUS DISTENTION
Detail Level: Simple
Additional Notes: Patient consent was obtained to proceed with the visit and recommended plan of care after discussion of all risks and benefits, including the risks of COVID-19 exposure.
absent

## 2023-05-08 NOTE — ED PROVIDER NOTE - NS ED MD DISPO DISCHARGE CCDA
Patient/Caregiver provided printed discharge information. Griseofulvin Pregnancy And Lactation Text: This medication is Pregnancy Category X and is known to cause serious birth defects. It is unknown if this medication is excreted in breast milk but breast feeding should be avoided.

## 2023-09-08 ENCOUNTER — EMERGENCY (EMERGENCY)
Facility: HOSPITAL | Age: 63
LOS: 1 days | Discharge: HOME | End: 2023-09-08
Attending: EMERGENCY MEDICINE
Payer: MEDICAID

## 2023-09-08 VITALS
WEIGHT: 169.98 LBS | RESPIRATION RATE: 19 BRPM | HEART RATE: 55 BPM | DIASTOLIC BLOOD PRESSURE: 93 MMHG | OXYGEN SATURATION: 96 % | SYSTOLIC BLOOD PRESSURE: 167 MMHG | HEIGHT: 60 IN | TEMPERATURE: 97 F

## 2023-09-08 DIAGNOSIS — Z98.891 HISTORY OF UTERINE SCAR FROM PREVIOUS SURGERY: Chronic | ICD-10-CM

## 2023-09-08 LAB
APPEARANCE UR: CLEAR — SIGNIFICANT CHANGE UP
BACTERIA # UR AUTO: NEGATIVE /HPF — SIGNIFICANT CHANGE UP
BILIRUB UR-MCNC: ABNORMAL
COLOR SPEC: SIGNIFICANT CHANGE UP
DIFF PNL FLD: NEGATIVE — SIGNIFICANT CHANGE UP
EPI CELLS # UR: PRESENT
GLUCOSE UR QL: NEGATIVE MG/DL — SIGNIFICANT CHANGE UP
KETONES UR-MCNC: ABNORMAL MG/DL
LEUKOCYTE ESTERASE UR-ACNC: ABNORMAL
NITRITE UR-MCNC: NEGATIVE — SIGNIFICANT CHANGE UP
PH UR: 5.5 — SIGNIFICANT CHANGE UP (ref 5–8)
PROT UR-MCNC: NEGATIVE MG/DL — SIGNIFICANT CHANGE UP
RBC CASTS # UR COMP ASSIST: 2 /HPF — SIGNIFICANT CHANGE UP (ref 0–4)
SP GR SPEC: 1.02 — SIGNIFICANT CHANGE UP (ref 1–1.03)
UROBILINOGEN FLD QL: 0.2 MG/DL — SIGNIFICANT CHANGE UP (ref 0.2–1)
WBC UR QL: 4 /HPF — SIGNIFICANT CHANGE UP (ref 0–5)

## 2023-09-08 PROCEDURE — 99284 EMERGENCY DEPT VISIT MOD MDM: CPT

## 2023-09-08 PROCEDURE — 99284 EMERGENCY DEPT VISIT MOD MDM: CPT | Mod: 25

## 2023-09-08 PROCEDURE — 72131 CT LUMBAR SPINE W/O DYE: CPT | Mod: 26,MA

## 2023-09-08 PROCEDURE — 96372 THER/PROPH/DIAG INJ SC/IM: CPT

## 2023-09-08 PROCEDURE — 81001 URINALYSIS AUTO W/SCOPE: CPT

## 2023-09-08 PROCEDURE — 72131 CT LUMBAR SPINE W/O DYE: CPT | Mod: MA

## 2023-09-08 RX ORDER — METHOCARBAMOL 500 MG/1
2 TABLET, FILM COATED ORAL
Qty: 16 | Refills: 0
Start: 2023-09-08 | End: 2023-09-09

## 2023-09-08 RX ORDER — LIDOCAINE 4 G/100G
1 CREAM TOPICAL ONCE
Refills: 0 | Status: COMPLETED | OUTPATIENT
Start: 2023-09-08 | End: 2023-09-08

## 2023-09-08 RX ORDER — METHOCARBAMOL 500 MG/1
750 TABLET, FILM COATED ORAL ONCE
Refills: 0 | Status: COMPLETED | OUTPATIENT
Start: 2023-09-08 | End: 2023-09-08

## 2023-09-08 RX ORDER — KETOROLAC TROMETHAMINE 30 MG/ML
30 SYRINGE (ML) INJECTION ONCE
Refills: 0 | Status: DISCONTINUED | OUTPATIENT
Start: 2023-09-08 | End: 2023-09-08

## 2023-09-08 RX ADMIN — LIDOCAINE 1 PATCH: 4 CREAM TOPICAL at 12:10

## 2023-09-08 RX ADMIN — Medication 30 MILLIGRAM(S): at 12:39

## 2023-09-08 RX ADMIN — Medication 30 MILLIGRAM(S): at 12:09

## 2023-09-08 RX ADMIN — METHOCARBAMOL 750 MILLIGRAM(S): 500 TABLET, FILM COATED ORAL at 12:10

## 2023-09-08 NOTE — ED PROVIDER NOTE - CARE PROVIDER_API CALL
Saulo Gómez  Pain Medicine  [address] [place] [address]  Phone: [phone]  Fax: [phone]  Follow Up Time: 7-10 Days

## 2023-09-08 NOTE — ED PROVIDER NOTE - NSFOLLOWUPINSTRUCTIONS_ED_ALL_ED_FT
1) Follow up with your doctor as discussed.   2) Return to the ED immediately for new or worsening symptoms like numbness, tingling, inability to walk, losing control of bladder or bowels  3) Please continue to take any home medications as prescribed  4) Your test results from your ED visit were discussed with you prior to discharge  5) You were provided with a copy of your test results      1) Jemima un seguimiento con schneider médico según lo comentado.  2) Regrese al servicio de urgencias inmediatamente si presenta síntomas nuevos o que empeoran, saray entumecimiento, hormigueo, incapacidad para caminar, pérdida del control de la vejiga o los intestinos.  3) Continúe tomando los medicamentos caseros según lo recetado.  4) Los resultados de las pruebas de schneider visita al servicio de urgencias se analizaron con usted antes del norris.  5) Se le proporcionó harpreet copia de los resultados de schneider prueba.      Dolor de espalda crónico  Chronic Back Pain  Cuando el dolor en la espalda dura más de 3 meses, se denomina dolor de espalda crónico. Es posible que se desconozca la causa de esta afección. Algunas causas frecuentes son las siguientes:  Desgaste (enfermedad degenerativa) de los huesos, los ligamentos o los discos de la espalda.  Inflamación y rigidez en la espalda (artritis).  Las personas que sufren dolor de espalda crónico generalmente atraviesan determinados períodos en los que nasreen es más intenso (episodios de exacerbación del dolor). Muchas personas pueden aprender a controlar el dolor de espalda con el cuidado en el hogar.    Siga estas instrucciones en schneider casa:  Esté atento a cualquier cambio en los síntomas. [place] estas medidas para aliviar el dolor:    Control del dolor y de la rigidez        Si se lo indican, aplique hielo sobre la damari dolorida. El médico puede recomendarle que se aplique hielo azalia las primeras 24 a 48 horas después del comienzo de un episodio de exacerbación del dolor. Para hacer esto:  Ponga el hielo en harpreet bolsa plástica.  Coloque harpreet toalla entre la piel y la bolsa.  Coloque el hielo azalia 20 minutos, 2 a 3 veces al día.  Si se lo indican, aplique calor en la damari afectada con la frecuencia que le haya indicado el médico. Use la laureen de calor que el médico le recomiende, saray harpreet compresa de calor húmedo o harpreet almohadilla térmica.  Coloque harpreet toalla entre la piel y la laureen de calor.  Aplique calor azalia 20 a 30 minutos.  Retire la laureen de calor si la piel se pone de color cohen brillante. [place] es especialmente importante si no puede sentir dolor, calor o frío. Puede correr un riesgo mayor de sufrir quemaduras.  Intente raiza un baño de inmersión con [place].  Actividad      Evite agacharse y realizar otras actividades que agraven el problema.  Mantenga harpreet postura correcta mientras está de pie o sentado:  Cuando esté de pie, mantenga la parte norris de la espalda y el valentina rectos, con los hombros hacia atrás. Evite encorvarse.  Cuando esté sentado, mantenga la espalda recta y relaje los hombros. No curve los hombros ni los eche hacia atrás.  No permanezca sentado o de pie en el mismo lugar azalia mucho tiempo.  Azalia el día, descanse azalia lapsos breves. [place] le aliviará el dolor. Descansar recostado o de pie suele ser mejor que hacerlo sentado.  Cuando descanse azalia períodos más largos, incorpore alguna actividad suave o ejercicios de elongación entre lupillo y otro. [place] ayudará a evitar la rigidez y el dolor.  Jemima ejercicio con regularidad. Pregúntele al médico qué actividades son seguras para usted.  No levante ningún objeto que pese más de 10 libras (4.5 kg) o que supere el límite de peso que le hayan indicado, hasta que el médico le diga que puede hacerlo. Siempre use las técnicas correctas para levantar objetos, entre ellas:  Flexionar las rodillas.  Mantener la carga cerca del cuerpo.  No torcerse.  Duerma sobre un colchón firme en harpreet posición cómoda. Intente acostarse de costado, con las rodillas ligeramente flexionadas. Si se recuesta sobre la espalda, coloque harpreet almohada debajo de las rodillas.  Medicamentos    El tratamiento puede incluir medicamentos para el dolor y la inflamación que se cabrera por boca o que se aplican sobre la piel, analgésicos recetados o relajantes musculares. Use los medicamentos de venta samm y los recetados solamente saray se lo haya indicado el médico.  Pregúntele al médico si el medicamento recetado:  Hace necesario que evite conducir o usar maquinaria.  Puede causarle estreñimiento. Es posible que tenga que raiza estas medidas para prevenir o tratar el estreñimiento:  Beber suficiente líquido saray para mantener la orina de color amarillo pálido.  Usar medicamentos recetados o de venta samm.  Consumir alimentos ricos en fibra, saray frijoles, cereales integrales, y frutas y verduras frescas.  Limitar el consumo de alimentos ricos en grasa y azúcares procesados, saray los alimentos fritos o dulces.  Instrucciones generales    No consuma ningún producto que contenga nicotina o tabaco, asray cigarrillos, cigarrillos electrónicos y tabaco de mascar. Si necesita ayuda para dejar de consumir estos productos, consulte al médico.  Concurra a todas las visitas de seguimiento saray se lo haya indicado el médico. [place] es importante.  Comuníquese con un médico si:  Siente un dolor que no se aubree con reposo o medicamentos.  Schneider dolor empeora o tiene un dolor nuevo.  Tiene fiebre norris.  Pierde de peso con rapidez.  Tiene dificultad para realizar las actividades cotidianas.  Solicite ayuda de inmediato si:  Siente debilidad o adormecimiento en harpreet o ambas piernas, o en lupillo o ambos pies.  Tiene dificultad para controlar la micción o la defecación.  Siente un dolor intenso en la espalda y tiene alguno de los siguientes síntomas:  Náuseas o vómitos.  Dolor en el abdomen.  Le falta el aire o se desmaya.  Resumen  El dolor de espalda crónico es aquel que dura más de 3 meses.  Cuando comienza un episodio de exacerbación del dolor, aplique hielo en la damari dolorida azalia las primeras 24 a 48 horas.  Aplíquese harpreet compresa de calor húmedo o harpreet almohadilla térmica saray se lo haya indicado el médico.  Cuando descanse azalia períodos más largos, incorpore alguna actividad suave o ejercicios de elongación entre lupillo y otro. [place] ayudará a evitar la rigidez y el dolor.  Esta información no tiene saray fin reemplazar el consejo del médico. Asegúrese de hacerle al médico cualquier pregunta que tenga.

## 2023-09-08 NOTE — ED PROVIDER NOTE - PHYSICAL EXAMINATION
Gen: NAD, AOx3, able to make needs known, non-toxic  Head: NCAT  HEENT: EOMI, oral mucosa moist, normal conjunctiva  Lung: CTAB, no respiratory distress, no wheezes/rhonchi/rales B/L, speaking in full sentences  CV: RRR, no murmurs  Abd: non distended, soft, nontender, no guarding, no CVA tenderness  MSK: No midline spinal tenderness, TARANGO x4, +left lumbar paraspinal tenderness  Neuro: Appears non focal  Skin: Warm, well perfused, no rash  Psych: normal affect

## 2023-09-08 NOTE — ED PROVIDER NOTE - ATTENDING APP SHARED VISIT CONTRIBUTION OF CARE
Benign seen with ACP complaining of back pain patient has received multiple shots for pain shots for her back pain still not better comes to the emergency department.  Urine is negative for blood and signs of infection CT of the lumbar spine is negative but there is evidence of epiploic appendagitis which is treated asymptomatically.  Will refer to private MD or clinic.Agree with assessment of ACP history and physical disposition and work-up.

## 2023-09-08 NOTE — ED PROVIDER NOTE - OBJECTIVE STATEMENT
62 y/o French speaking F  ID [id number] used  PM HTN, HLD presenting to ED for left lower back pain x 3 months. Denies trauma. States she intermittently has had similar flares of pain, went to PMD given "injections" into the muscle which relieved pain. Given diclofenac but pain has not improved. States pain wraps from left lower back to left flank. Denies chest pain, SOB, abd pain, n/v/d, urinary symptoms, fevers, chills, weight loss, recent steroid use, bowel or bladder incontinence, IV drug use. 64 y/o Bulgarian speaking F  ID [id number] used  PM HTN, HLD presenting to ED for left lower back pain x 3 months. Denies trauma. States she intermittently has had similar flares of pain, went to PMD given "injections" into the muscle which relieved pain. Given diclofenac but pain has not improved. States pain wraps from left lower back to left flank. Denies chest pain, SOB, abd pain, n/v/d, urinary symptoms, fevers, chills, weight loss, saddle anesthesia,  recent steroid use, bowel or bladder incontinence, IV drug use.

## 2023-09-08 NOTE — ED PROVIDER NOTE - NSICDXPASTMEDICALHX_GEN_ALL_CORE_FT
PAST MEDICAL HISTORY:  Herniated disc, cervical not cervial, lumbar region    HLD (hyperlipidemia)     HTN (hypertension)     Migraine     Rheumatoid arthritis

## 2023-09-08 NOTE — ED PROVIDER NOTE - PATIENT PORTAL LINK FT
You can access the FollowMyHealth Patient Portal offered by [facility] by registering at the following website: [email or web address]. By joining [facility]’s FollowMyHealth portal, you will also be able to view your health information using other applications (apps) compatible with our system.

## 2023-09-08 NOTE — ED PROVIDER NOTE - NS ED ROS FT
GENERAL: No fever or chills  EYES: No change in vision  HEENT: No trouble swallowing or speaking  CARDIAC: No chest pain  PULMONARY: No cough or SOB  GI: No abdominal pain, no nausea or no vomiting, no diarrhea or constipation  : No changes in urination  SKIN: No rashes  NEURO: No headache, no numbness  MSK: +BACK PAIN  Otherwise as HPI or negative.

## 2023-09-08 NOTE — ED PROVIDER NOTE - INTERPRETER'S NAME
Triage: Pt arrives from home with CC of general malaise since yesterday. Pt reports headache, nausea, and the sensation that heat is coming from her ears and eyes. She denies sick contacts. Denies fever. ERNESTO

## 2023-10-02 NOTE — ED ADULT TRIAGE NOTE - ARRIVAL FROM
Spoke patient and per Dr Falcon patient does not need to be seen until Nov, Patient Verbally Understand.   Home

## 2024-01-29 ENCOUNTER — APPOINTMENT (OUTPATIENT)
Dept: CARDIOLOGY | Facility: CLINIC | Age: 64
End: 2024-01-29

## 2024-03-11 ENCOUNTER — EMERGENCY (EMERGENCY)
Facility: HOSPITAL | Age: 64
LOS: 1 days | Discharge: HOME | End: 2024-03-11
Attending: EMERGENCY MEDICINE
Payer: MEDICAID

## 2024-03-11 VITALS
TEMPERATURE: 98 F | RESPIRATION RATE: 18 BRPM | WEIGHT: 160.06 LBS | HEART RATE: 60 BPM | SYSTOLIC BLOOD PRESSURE: 127 MMHG | OXYGEN SATURATION: 100 % | DIASTOLIC BLOOD PRESSURE: 87 MMHG

## 2024-03-11 DIAGNOSIS — Z98.891 HISTORY OF UTERINE SCAR FROM PREVIOUS SURGERY: Chronic | ICD-10-CM

## 2024-03-11 PROCEDURE — 99283 EMERGENCY DEPT VISIT LOW MDM: CPT

## 2024-03-11 PROCEDURE — 99284 EMERGENCY DEPT VISIT MOD MDM: CPT

## 2024-03-11 PROCEDURE — 93005 ELECTROCARDIOGRAM TRACING: CPT

## 2024-03-11 PROCEDURE — 82962 GLUCOSE BLOOD TEST: CPT

## 2024-03-11 NOTE — ED PROVIDER NOTE - NSFOLLOWUPINSTRUCTIONS_ED_ALL_ED_FT
return if symptoms worsens. obtain a blood pressure machine    regresar si los síntomas empeoran. obtener harpreet máquina de presión arterial

## 2024-03-11 NOTE — ED ADULT NURSE NOTE - NSFALLUNIVINTERV_ED_ALL_ED
Bed/Stretcher in lowest position, wheels locked, appropriate side rails in place/Call bell, personal items and telephone in reach/Instruct patient to call for assistance before getting out of bed/chair/stretcher/Non-slip footwear applied when patient is off stretcher/[place] to call system/Physically safe environment - no spills, clutter or unnecessary equipment/Purposeful proactive rounding/Room/bathroom lighting operational, light cord in reach

## 2024-03-11 NOTE — ED PROVIDER NOTE - PATIENT PORTAL LINK FT
You can access the FollowMyHealth Patient Portal offered by [facility] by registering at the following website: [email or web address]. By joining [employer]’s FollowMyHealth portal, you will also be able to view your health information using other applications (apps) compatible with our system.

## 2024-03-11 NOTE — ED PROVIDER NOTE - OBJECTIVE STATEMENT
63-year-old female with history of hypertension, hyperlipidemia, arthritis presents to the ED complaining of feeling weak, nausea, epigastric discomfort, spit up minimal amount of vomit and was concerned that her blood pressure was low this morning upon waking up.  Patient admits last night had empanadas, 3 glasses of butch and might have been a little drunk per . no syncope, no fever, no visual changes, no cp, no sob, diarrhea.

## 2024-03-11 NOTE — ED PROVIDER NOTE - CLINICAL SUMMARY MEDICAL DECISION MAKING FREE TEXT BOX
63-year-old female with history of hypertension, hyperlipidemia, arthritis presents to the ED complaining of feeling weak, nausea, epigastric discomfort, spit up minimal amount of vomit and was concerned that her blood pressure was low this morning upon waking up.  Patient admits last night had empanadas, 3 glasses of butch and might have been a little drunk per . no syncope, no fever, no visual changes, no cp, no sob, diarrhea.     patient now asymptomatic asking to leave.  Benign exam.  EKG and fingerstick unremarkable.  Discharge

## 2024-03-11 NOTE — ED ADULT NURSE NOTE - OBJECTIVE STATEMENT
Patient stated woke up this morning feeling weak, nauseas, denies any pain or discomfort at present, willing to be discharge.

## 2024-03-21 ENCOUNTER — APPOINTMENT (OUTPATIENT)
Dept: CARDIOLOGY | Facility: CLINIC | Age: 64
End: 2024-03-21
Payer: MEDICAID

## 2024-03-21 ENCOUNTER — NON-APPOINTMENT (OUTPATIENT)
Age: 64
End: 2024-03-21

## 2024-03-21 VITALS
HEART RATE: 64 BPM | HEIGHT: 62 IN | OXYGEN SATURATION: 98 % | SYSTOLIC BLOOD PRESSURE: 149 MMHG | BODY MASS INDEX: 32.39 KG/M2 | WEIGHT: 176 LBS | TEMPERATURE: 98.1 F | DIASTOLIC BLOOD PRESSURE: 88 MMHG

## 2024-03-21 DIAGNOSIS — R06.00 DYSPNEA, UNSPECIFIED: ICD-10-CM

## 2024-03-21 PROCEDURE — 99204 OFFICE O/P NEW MOD 45 MIN: CPT | Mod: 25

## 2024-03-21 PROCEDURE — 93000 ELECTROCARDIOGRAM COMPLETE: CPT

## 2024-03-21 PROCEDURE — G2211 COMPLEX E/M VISIT ADD ON: CPT | Mod: NC,1L

## 2024-03-21 NOTE — REASON FOR VISIT
[FreeTextEntry1] : CC: "HTN" HPI: 63F with HTN, HLD, prediabetes presenting to establish care.   HTN- elevated.  Losartan 100 mg daily Atorvastatin 20 mg daily  reports ANDERSEN with activity with more than 1 flight of stairs.   ROS:  chest pain (-), dyspnea with exertion (+), orthopnea (-), edema (-), palpitations (-). All other systems were reviewed and are negative.   PFSH: Patient's current cardiovascular medications were reviewed and updated in chart. PMHx as described in HPI above. No prior cardiac testing available for review. Patient does not recall - many years ago.  No family history of premature CAD, or SCD. SH: tobacco (-), alcohol (-), recreational drugs (-)

## 2024-03-21 NOTE — ASSESSMENT
[FreeTextEntry1] : 1, ANDERSEN:  Echo, pro-BNP 2. HTN: elevated  continue losartan, will add adjunct 3. HLD, check lipid panel.

## 2024-03-21 NOTE — PHYSICAL EXAM
[Well Developed] : well developed [Well Nourished] : well nourished [No Acute Distress] : no acute distress [Normal Conjunctiva] : normal conjunctiva [Normal Venous Pressure] : normal venous pressure [No Carotid Bruit] : no carotid bruit [Normal S1, S2] : normal S1, S2 [No Murmur] : no murmur [No Rub] : no rub [No Gallop] : no gallop [Clear Lung Fields] : clear lung fields [Good Air Entry] : good air entry [No Respiratory Distress] : no respiratory distress  [Soft] : abdomen soft [Non Tender] : non-tender [Normal Bowel Sounds] : normal bowel sounds [No Masses/organomegaly] : no masses/organomegaly [No Edema] : no edema [Normal Gait] : normal gait [No Clubbing] : no clubbing [No Cyanosis] : no cyanosis [No Varicosities] : no varicosities [No Rash] : no rash [Moves all extremities] : moves all extremities [No Skin Lesions] : no skin lesions [No Focal Deficits] : no focal deficits [Alert and Oriented] : alert and oriented [Normal Speech] : normal speech [Normal memory] : normal memory

## 2024-03-24 LAB
ALBUMIN SERPL ELPH-MCNC: 4.5 G/DL
ALP BLD-CCNC: 67 U/L
ALT SERPL-CCNC: 23 U/L
ANION GAP SERPL CALC-SCNC: 13 MMOL/L
AST SERPL-CCNC: 21 U/L
BILIRUB SERPL-MCNC: 0.3 MG/DL
BUN SERPL-MCNC: 21 MG/DL
CALCIUM SERPL-MCNC: 9.6 MG/DL
CHLORIDE SERPL-SCNC: 103 MMOL/L
CHOLEST SERPL-MCNC: 199 MG/DL
CO2 SERPL-SCNC: 24 MMOL/L
CREAT SERPL-MCNC: 0.71 MG/DL
EGFR: 95 ML/MIN/1.73M2
ESTIMATED AVERAGE GLUCOSE: 131 MG/DL
GLUCOSE SERPL-MCNC: 85 MG/DL
HBA1C MFR BLD HPLC: 6.2 %
HCT VFR BLD CALC: 40.9 %
HDLC SERPL-MCNC: 90 MG/DL
HGB BLD-MCNC: 13.2 G/DL
LDLC SERPL CALC-MCNC: 93 MG/DL
MCHC RBC-ENTMCNC: 32.1 PG
MCHC RBC-ENTMCNC: 32.3 GM/DL
MCV RBC AUTO: 99.5 FL
NONHDLC SERPL-MCNC: 109 MG/DL
NT-PROBNP SERPL-MCNC: 101 PG/ML
PLATELET # BLD AUTO: 258 K/UL
POTASSIUM SERPL-SCNC: 4.5 MMOL/L
PROT SERPL-MCNC: 7 G/DL
RBC # BLD: 4.11 M/UL
RBC # FLD: 13.4 %
SODIUM SERPL-SCNC: 140 MMOL/L
TRIGL SERPL-MCNC: 90 MG/DL
WBC # FLD AUTO: 5.55 K/UL

## 2024-04-02 ENCOUNTER — APPOINTMENT (OUTPATIENT)
Dept: RADIOLOGY | Facility: CLINIC | Age: 64
End: 2024-04-02

## 2024-04-02 PROCEDURE — 73522 X-RAY EXAM HIPS BI 3-4 VIEWS: CPT

## 2024-04-14 ENCOUNTER — EMERGENCY (EMERGENCY)
Facility: HOSPITAL | Age: 64
LOS: 1 days | Discharge: LEFT BEFORE DISCHARGE | End: 2024-04-14
Attending: EMERGENCY MEDICINE
Payer: MEDICAID

## 2024-04-14 VITALS
TEMPERATURE: 98 F | OXYGEN SATURATION: 96 % | HEIGHT: 61 IN | DIASTOLIC BLOOD PRESSURE: 78 MMHG | SYSTOLIC BLOOD PRESSURE: 137 MMHG | WEIGHT: 169.98 LBS | HEART RATE: 72 BPM | RESPIRATION RATE: 18 BRPM

## 2024-04-14 DIAGNOSIS — Z98.891 HISTORY OF UTERINE SCAR FROM PREVIOUS SURGERY: Chronic | ICD-10-CM

## 2024-04-14 PROCEDURE — L9991: CPT

## 2024-04-14 NOTE — ED ADULT TRIAGE NOTE - CHIEF COMPLAINT QUOTE
Pt stated she felt sharp pain on the right side of her head, denies nausea or vomiting,  any injuries or weakness or feeling pain at this time.

## 2024-04-15 NOTE — ED PROVIDER NOTE - PROGRESS NOTE DETAILS
Pt not at bedside.  Waiting room and remainder of ED searched. Unable to find patient.  Attempted to call multiple times and no answer.  Patient left without being seen.

## 2024-04-25 ENCOUNTER — APPOINTMENT (OUTPATIENT)
Dept: CARDIOLOGY | Facility: CLINIC | Age: 64
End: 2024-04-25
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
## 2024-06-07 ENCOUNTER — APPOINTMENT (OUTPATIENT)
Dept: MAMMOGRAPHY | Facility: CLINIC | Age: 64
End: 2024-06-07
Payer: MEDICAID

## 2024-06-07 PROCEDURE — 76642 ULTRASOUND BREAST LIMITED: CPT | Mod: LT

## 2024-06-07 PROCEDURE — 77062 BREAST TOMOSYNTHESIS BI: CPT

## 2024-06-07 PROCEDURE — 77066 DX MAMMO INCL CAD BI: CPT

## 2024-08-01 NOTE — ED ADULT NURSE NOTE - CAS DISCH TRANSFER METHOD
BARB Proposed 08/01/24 to [facility]   
[facility]    methylphenidate (Ritalin) 20 mg tablet [[id number]]    
Private car

## 2024-08-13 ENCOUNTER — APPOINTMENT (OUTPATIENT)
Dept: MRI IMAGING | Facility: CLINIC | Age: 64
End: 2024-08-13

## 2024-11-21 NOTE — ED PROVIDER NOTE - CLINICAL SUMMARY MEDICAL DECISION MAKING FREE TEXT BOX
Mother inquiring about testing for gluten allergy. States she doesn't see it in pt's orders and was discussed at today's appt. Confirmed with Dr. Hendricks and orders received for celiac panel. Mother notified. Order placed internally per mother's request.   ----- Message from Med Assistant Humaira sent at 11/21/2024  2:36 PM CST -----  Regarding: Lab question  Contact: mom  Mom called that pt was in earlier today with Dr Hendricks. She had a question about a lab request that was sent over after the visit.    Call back: [phone]   62 y/o Lithuanian speaking F  ID [id number] used  PM HTN, HLD presenting to ED for left lower back pain x 3 months. Denies trauma. States she intermittently has had similar flares of pain, went to PMD given "injections" into the muscle which relieved pain. Given diclofenac but pain has not improved. States pain wraps from left lower back to left flank. Denies chest pain, SOB, abd pain, n/v/d, urinary symptoms, fevers, chills, weight loss, recent steroid use, bowel or bladder incontinence, IV drug use. PE notable for left lumbar paraspinal tenderness, left flank ttp, no rash. 64 y/o Syriac speaking F  ID [id number] used  PM HTN, HLD presenting to ED for left lower back pain x 3 months. Denies trauma. States she intermittently has had similar flares of pain, went to PMD given "injections" into the muscle which relieved pain. Given diclofenac but pain has not improved. States pain wraps from left lower back to left flank. Denies chest pain, SOB, abd pain, n/v/d, urinary symptoms, fevers, chills, weight loss, saddle anesthesia, recent steroid use, bowel or bladder incontinence, IV drug use. PE notable for left lumbar paraspinal tenderness, left flank ttp, no rash. Possibly MSK pain, less likely nephrolithiases without urinary symptoms, no red flag signs concerning for acute spinal cord pathology, or bony vertebral pathology. Will obtain UA, analgesia, reassess, dispo accordingly.

## 2025-02-28 ENCOUNTER — EMERGENCY (EMERGENCY)
Facility: HOSPITAL | Age: 65
LOS: 1 days | Discharge: HOME | End: 2025-02-28
Attending: STUDENT IN AN ORGANIZED HEALTH CARE EDUCATION/TRAINING PROGRAM
Payer: MEDICARE

## 2025-02-28 VITALS
TEMPERATURE: 98 F | OXYGEN SATURATION: 99 % | DIASTOLIC BLOOD PRESSURE: 67 MMHG | WEIGHT: 154.98 LBS | SYSTOLIC BLOOD PRESSURE: 141 MMHG | HEIGHT: 65 IN | RESPIRATION RATE: 18 BRPM | HEART RATE: 60 BPM

## 2025-02-28 VITALS
DIASTOLIC BLOOD PRESSURE: 77 MMHG | OXYGEN SATURATION: 97 % | HEART RATE: 54 BPM | RESPIRATION RATE: 17 BRPM | SYSTOLIC BLOOD PRESSURE: 126 MMHG | TEMPERATURE: 98 F

## 2025-02-28 DIAGNOSIS — Z98.891 HISTORY OF UTERINE SCAR FROM PREVIOUS SURGERY: Chronic | ICD-10-CM

## 2025-02-28 LAB
ALBUMIN SERPL ELPH-MCNC: 3.5 G/DL — SIGNIFICANT CHANGE UP (ref 3.5–5)
ALP SERPL-CCNC: 65 U/L — SIGNIFICANT CHANGE UP (ref 40–120)
ALT FLD-CCNC: 27 U/L DA — SIGNIFICANT CHANGE UP (ref 10–60)
ANION GAP SERPL CALC-SCNC: 7 MMOL/L — SIGNIFICANT CHANGE UP (ref 5–17)
APPEARANCE UR: CLEAR — SIGNIFICANT CHANGE UP
AST SERPL-CCNC: 20 U/L — SIGNIFICANT CHANGE UP (ref 10–40)
BASOPHILS # BLD AUTO: 0.02 K/UL — SIGNIFICANT CHANGE UP (ref 0–0.2)
BASOPHILS NFR BLD AUTO: 0.3 % — SIGNIFICANT CHANGE UP (ref 0–2)
BILIRUB SERPL-MCNC: 0.6 MG/DL — SIGNIFICANT CHANGE UP (ref 0.2–1.2)
BILIRUB UR-MCNC: NEGATIVE — SIGNIFICANT CHANGE UP
BUN SERPL-MCNC: 26 MG/DL — HIGH (ref 7–18)
CALCIUM SERPL-MCNC: 9.3 MG/DL — SIGNIFICANT CHANGE UP (ref 8.4–10.5)
CHLORIDE SERPL-SCNC: 107 MMOL/L — SIGNIFICANT CHANGE UP (ref 96–108)
CO2 SERPL-SCNC: 27 MMOL/L — SIGNIFICANT CHANGE UP (ref 22–31)
COLOR SPEC: YELLOW — SIGNIFICANT CHANGE UP
CREAT SERPL-MCNC: 0.7 MG/DL — SIGNIFICANT CHANGE UP (ref 0.5–1.3)
DIFF PNL FLD: NEGATIVE — SIGNIFICANT CHANGE UP
EGFR: 96 ML/MIN/1.73M2 — SIGNIFICANT CHANGE UP
EOSINOPHIL # BLD AUTO: 0.14 K/UL — SIGNIFICANT CHANGE UP (ref 0–0.5)
EOSINOPHIL NFR BLD AUTO: 1.9 % — SIGNIFICANT CHANGE UP (ref 0–6)
EPI CELLS # UR: PRESENT
GLUCOSE SERPL-MCNC: 136 MG/DL — HIGH (ref 70–99)
GLUCOSE UR QL: NEGATIVE MG/DL — SIGNIFICANT CHANGE UP
HCT VFR BLD CALC: 37.9 % — SIGNIFICANT CHANGE UP (ref 34.5–45)
HGB BLD-MCNC: 12.6 G/DL — SIGNIFICANT CHANGE UP (ref 11.5–15.5)
IMM GRANULOCYTES NFR BLD AUTO: 0.3 % — SIGNIFICANT CHANGE UP (ref 0–0.9)
KETONES UR-MCNC: NEGATIVE MG/DL — SIGNIFICANT CHANGE UP
LEUKOCYTE ESTERASE UR-ACNC: ABNORMAL
LIDOCAIN IGE QN: 37 U/L — SIGNIFICANT CHANGE UP (ref 13–75)
LYMPHOCYTES # BLD AUTO: 1.55 K/UL — SIGNIFICANT CHANGE UP (ref 1–3.3)
LYMPHOCYTES # BLD AUTO: 21.3 % — SIGNIFICANT CHANGE UP (ref 13–44)
MCHC RBC-ENTMCNC: 32.2 PG — SIGNIFICANT CHANGE UP (ref 27–34)
MCHC RBC-ENTMCNC: 33.2 G/DL — SIGNIFICANT CHANGE UP (ref 32–36)
MCV RBC AUTO: 96.9 FL — SIGNIFICANT CHANGE UP (ref 80–100)
MONOCYTES # BLD AUTO: 0.52 K/UL — SIGNIFICANT CHANGE UP (ref 0–0.9)
MONOCYTES NFR BLD AUTO: 7.1 % — SIGNIFICANT CHANGE UP (ref 2–14)
NEUTROPHILS # BLD AUTO: 5.03 K/UL — SIGNIFICANT CHANGE UP (ref 1.8–7.4)
NEUTROPHILS NFR BLD AUTO: 69.1 % — SIGNIFICANT CHANGE UP (ref 43–77)
NITRITE UR-MCNC: NEGATIVE — SIGNIFICANT CHANGE UP
NRBC BLD AUTO-RTO: 0 /100 WBCS — SIGNIFICANT CHANGE UP (ref 0–0)
PH UR: 8 — SIGNIFICANT CHANGE UP (ref 5–8)
PLATELET # BLD AUTO: 252 K/UL — SIGNIFICANT CHANGE UP (ref 150–400)
POTASSIUM SERPL-MCNC: 4.5 MMOL/L — SIGNIFICANT CHANGE UP (ref 3.5–5.3)
POTASSIUM SERPL-SCNC: 4.5 MMOL/L — SIGNIFICANT CHANGE UP (ref 3.5–5.3)
PROT SERPL-MCNC: 6.6 G/DL — SIGNIFICANT CHANGE UP (ref 6–8.3)
PROT UR-MCNC: NEGATIVE MG/DL — SIGNIFICANT CHANGE UP
RBC # BLD: 3.91 M/UL — SIGNIFICANT CHANGE UP (ref 3.8–5.2)
RBC # FLD: 13.3 % — SIGNIFICANT CHANGE UP (ref 10.3–14.5)
RBC CASTS # UR COMP ASSIST: 0 /HPF — SIGNIFICANT CHANGE UP (ref 0–4)
SODIUM SERPL-SCNC: 141 MMOL/L — SIGNIFICANT CHANGE UP (ref 135–145)
SP GR SPEC: 1.01 — SIGNIFICANT CHANGE UP (ref 1–1.03)
TROPONIN I, HIGH SENSITIVITY RESULT: 24.1 NG/L — SIGNIFICANT CHANGE UP
UROBILINOGEN FLD QL: 0.2 MG/DL — SIGNIFICANT CHANGE UP (ref 0.2–1)
WBC # BLD: 7.28 K/UL — SIGNIFICANT CHANGE UP (ref 3.8–10.5)
WBC # FLD AUTO: 7.28 K/UL — SIGNIFICANT CHANGE UP (ref 3.8–10.5)
WBC UR QL: 1 /HPF — SIGNIFICANT CHANGE UP (ref 0–5)

## 2025-02-28 PROCEDURE — 80053 COMPREHEN METABOLIC PANEL: CPT

## 2025-02-28 PROCEDURE — 96374 THER/PROPH/DIAG INJ IV PUSH: CPT

## 2025-02-28 PROCEDURE — 83690 ASSAY OF LIPASE: CPT

## 2025-02-28 PROCEDURE — 99284 EMERGENCY DEPT VISIT MOD MDM: CPT | Mod: 25

## 2025-02-28 PROCEDURE — 99285 EMERGENCY DEPT VISIT HI MDM: CPT

## 2025-02-28 PROCEDURE — 84484 ASSAY OF TROPONIN QUANT: CPT

## 2025-02-28 PROCEDURE — 87086 URINE CULTURE/COLONY COUNT: CPT

## 2025-02-28 PROCEDURE — 85025 COMPLETE CBC W/AUTO DIFF WBC: CPT

## 2025-02-28 PROCEDURE — 81001 URINALYSIS AUTO W/SCOPE: CPT

## 2025-02-28 PROCEDURE — 36415 COLL VENOUS BLD VENIPUNCTURE: CPT

## 2025-02-28 PROCEDURE — 93005 ELECTROCARDIOGRAM TRACING: CPT

## 2025-02-28 PROCEDURE — 87077 CULTURE AEROBIC IDENTIFY: CPT

## 2025-02-28 PROCEDURE — 96375 TX/PRO/DX INJ NEW DRUG ADDON: CPT

## 2025-02-28 RX ORDER — SODIUM CHLORIDE 9 G/1000ML
1000 INJECTION, SOLUTION INTRAVENOUS ONCE
Refills: 0 | Status: COMPLETED | OUTPATIENT
Start: 2025-02-28 | End: 2025-02-28

## 2025-02-28 RX ORDER — ONDANSETRON HCL/PF 4 MG/2 ML
1 VIAL (ML) INJECTION
Qty: 6 | Refills: 0
Start: 2025-02-28

## 2025-02-28 RX ORDER — ACETAMINOPHEN 500 MG/5ML
1000 LIQUID (ML) ORAL ONCE
Refills: 0 | Status: COMPLETED | OUTPATIENT
Start: 2025-02-28 | End: 2025-02-28

## 2025-02-28 RX ORDER — MAGNESIUM, ALUMINUM HYDROXIDE 200-200 MG
30 TABLET,CHEWABLE ORAL ONCE
Refills: 0 | Status: COMPLETED | OUTPATIENT
Start: 2025-02-28 | End: 2025-02-28

## 2025-02-28 RX ORDER — ONDANSETRON HCL/PF 4 MG/2 ML
4 VIAL (ML) INJECTION ONCE
Refills: 0 | Status: COMPLETED | OUTPATIENT
Start: 2025-02-28 | End: 2025-02-28

## 2025-02-28 RX ADMIN — SODIUM CHLORIDE 1000 MILLILITER(S): 9 INJECTION, SOLUTION INTRAVENOUS at 02:38

## 2025-02-28 RX ADMIN — Medication 4 MILLIGRAM(S): at 02:38

## 2025-02-28 RX ADMIN — Medication 400 MILLIGRAM(S): at 02:37

## 2025-02-28 RX ADMIN — Medication 30 MILLILITER(S): at 02:38

## 2025-02-28 RX ADMIN — Medication 20 MILLIGRAM(S): at 02:38

## 2025-02-28 NOTE — ED PROVIDER NOTE - PROGRESS NOTE DETAILS
Cholo: pt seen and re-evaluated at bedside using  #[id number].  Pt states their symptoms have improved.  Pt comfortable in NAD.  Labs with non-actionable findings. Patient states she feels much better, tolerating PO intake. Will DC with outpatient follow up/return precautions, pt understood and agreeable with plan.

## 2025-02-28 NOTE — ED PROVIDER NOTE - PHYSICAL EXAMINATION
CONSTITUTIONAL: non-toxic, well appearing  SKIN: no rash, no petechiae.  EYES: PERRL, EOMI, pink conjunctiva, anicteric  ENT: tongue and uvular midline, no exudates, moist mucous membranes  NECK: Supple; no meningismus, no JVD  CARD: RRR, no murmurs, equal radial pulses bilaterally 2+  RESP: CTAB, no respiratory distress  ABD: Soft, tender over epigastrium and LUQ, non-distended, no peritoneal signs  EXT: Normal ROM x4. No edema.   NEURO: Alert, oriented. Neuro exam nonfocal  PSYCH: Cooperative, appropriate.

## 2025-02-28 NOTE — ED PROVIDER NOTE - OBJECTIVE STATEMENT
#[id number]    64-year-old female with past medical history hypertension, hyperlipidemia, migraines presents with epigastric pain today.  Patient reports upper abdominal pain, nausea, and bilateral frontal headache.  Denies any fevers, chest pain, shortness of breath, vomiting, diarrhea, dysuria, bloody stools, black tarry stools, numbness, focal weakness, rash.  Surgical history significant for  in the past.  Patient tolerating oral intake.  Denies any additional complaints.

## 2025-02-28 NOTE — ED PROVIDER NOTE - NSFOLLOWUPINSTRUCTIONS_ED_ALL_ED_FT
Náuseas, en adultos  Nausea, Adult    Las náuseas son harpreet sensación de malestar en el estómago o de que se está por vomitar. Las náuseas en sí a menudo no constituyen harpreet preocupación importante, anna marie pueden ser un signo temprano de problemas médicos más graves. Si las náuseas empeoran, pueden provocar vómitos. Si los vómitos empeoran o si usted no puede beber suficiente líquido, corre el riesgo de deshidratarse. La deshidratación puede causarle cansancio, sed, sequedad en la boca y disminución en la frecuencia con la que orina. Los adultos mayores y las personas que tienen otras enfermedades o un sistema de defensa (sistema inmunitario) débil tienen mayor riesgo de sufrir deshidratación. Los objetivos principales del tratamiento de las náuseas son los siguientes:    Aliviar flower náuseas.  Restringir los episodios reiterados de náuseas.  Prevenir los vómitos y la deshidratación.    Siga estas indicaciones en schneider casa:  Controle flower síntomas para detectar cualquier cambio. Informe al médico acerca de los cambios. Siga las siguientes indicaciones saray se lo haya indicado schneider médico.        Comida y bebida      [place] harpreet solución de rehidratación oral (SRO). Esta es harpreet bebida que se vende en farmacias y tiendas minoristas.  En la medida en que pueda, samreen líquidos ashwin lentamente y en pequeñas cantidades. Los líquidos ashwin incluyen agua, cubitos de hielo, bebidas deportivas bajas en calorías y jugo de fruta rebajado con agua (jugo de fruta diluido).  En la medida en que pueda, consuma alimentos blandos y fáciles de digerir en pequeñas cantidades. Estos alimentos incluyen bananas, compota de manzana, arroz, nicho magras, tostadas y galletas saladas.  Evite consumir líquidos que contengan mucha azúcar o cafeína, saray bebidas energéticas, bebidas deportivas y refrescos.  Evite raiza alcohol.  Evite los alimentos condimentados o con alto contenido de grasa.        Indicaciones generales    [place] los medicamentos de venta samm y los recetados solamente saray se lo haya indicado el médico.  Descanse en schneider casa mientras se recupera.  Samreen suficiente líquido saray para mantener la orina de color amarillo pálido.  Cuando sienta náuseas, respire lenta y profundamente.  Evite oler cosas que tengan olores yasmani.  Lávese las gama frecuentemente usando agua y jabón. Use desinfectante para gama si no dispone de agua y jabón.  Asegúrese de que todas las personas que viven en schneider casa se laven sarah las gama y con frecuencia.  Concurra a todas las visitas de control saray se lo haya indicado el médico. [place] es importante.    Comuníquese con un médico si:  Las náuseas empeoran.  Las náuseas no desaparecen después de dos días.  Vomita.  No puede beber líquidos sin vomitar.  Tiene alguno de los siguientes síntomas:    Síntomas nuevos.  Fiebre.  Dolor de ayad.  Calambres musculares.  Erupción cutánea.  Dolor al orinar.  Se siente aturdido o mareado.    Solicite ayuda inmediatamente si:  Siente dolor en el pecho, el valentina, los brazos o la mandíbula.  Se siente muy débil o se desmaya.  Vomita y el vómito es de color cohen intenso o se asemeja al poso del café.  Tiene heces con parish, de color ruddy, o con aspecto alquitranado.  Siente dolor de ayad intenso, rigidez en el valentina, o ambas cosas.  Tiene dolor intenso, cólicos o distensión abdominal.  Tiene dificultades para respirar o respira muy rápido.  Schneider corazón late muy rápidamente.  Siente la piel fría y húmeda.  Se siente confundido.  Tiene signos de deshidratación, saray los siguientes:    Orina de color oscuro, muy escasa o falta de orina.  Labios agrietados.  Sequedad de boca.  Ojos hundidos.  Somnolencia.  Debilidad.    Estos síntomas pueden representar un problema grave que constituye harpreet emergencia. No espere a daniel si los síntomas desaparecen. Solicite atención médica de inmediato. Comuníquese con el servicio de emergencias de schneider localidad (911 en los [place]). No conduzca por flower propios medios hasta el hospital.    Resumen  Las náuseas son harpreet sensación de malestar en el estómago o de que se está por vomitar. Las náuseas en sí a menudo no constituyen harpreet preocupación importante, anna marie pueden ser un signo temprano de problemas médicos más graves.  Si los vómitos empeoran o si usted no puede beber suficiente líquido, corre el riesgo de deshidratarse.  Siga las recomendaciones sobre qué debe comer y beber y use los medicamentos de venta samm y recetados solamente saray se lo haya indicado el médico.  Comuníquese con un médico de inmediato si los síntomas empeoran o si presenta nuevos síntomas.  Concurra a todas las visitas de control saray se lo haya indicado el médico. [place] es importante.

## 2025-02-28 NOTE — ED PROVIDER NOTE - PATIENT PORTAL LINK FT
You can access the FollowMyHealth Patient Portal offered by [facility] by registering at the following website: [email or web address]. By joining [employer]’s FollowMyHealth portal, you will also be able to view your health information using other applications (apps) compatible with our system.

## 2025-02-28 NOTE — ED PROVIDER NOTE - CLINICAL SUMMARY MEDICAL DECISION MAKING FREE TEXT BOX
Cholo: 64-year-old female with past medical history hypertension, hyperlipidemia, migraines presents with epigastric pain today.  Patient reports upper abdominal pain, nausea, and bilateral frontal headache.  Denies any fevers, chest pain, shortness of breath, vomiting, diarrhea, dysuria, bloody stools, black tarry stools, numbness, focal weakness, rash.  Surgical history significant for  in the past.  Patient tolerating oral intake.  Physical exam per above. Abdomen tender over epigastrium and LUQ. No evidence of subarachnoid hemorrhage, intracranial bleed, meningitis, encephalitis, temporal arteritis, or intracranial mass. Not consistent with anginal equivalent, pain reproducible. Plan includes labs, supportive treatment, PO trial with dispo pending workup.

## 2025-02-28 NOTE — ED ADULT NURSE NOTE - NSFALLOOBATTEMPT_ED_ALL_ED
Quality 110: Preventive Care And Screening: Influenza Immunization: Influenza Immunization not Administered because Patient Refused. Detail Level: Detailed No

## 2025-03-01 LAB
CULTURE RESULTS: ABNORMAL
SPECIMEN SOURCE: SIGNIFICANT CHANGE UP